# Patient Record
Sex: FEMALE | Race: ASIAN | NOT HISPANIC OR LATINO | Employment: UNEMPLOYED | URBAN - NONMETROPOLITAN AREA
[De-identification: names, ages, dates, MRNs, and addresses within clinical notes are randomized per-mention and may not be internally consistent; named-entity substitution may affect disease eponyms.]

---

## 2019-12-06 ENCOUNTER — TRANSFERRED RECORDS (OUTPATIENT)
Dept: HEALTH INFORMATION MANAGEMENT | Facility: OTHER | Age: 44
End: 2019-12-06

## 2019-12-16 ENCOUNTER — OFFICE VISIT (OUTPATIENT)
Dept: FAMILY MEDICINE | Facility: OTHER | Age: 44
End: 2019-12-16
Attending: NURSE PRACTITIONER

## 2019-12-16 ENCOUNTER — HOSPITAL ENCOUNTER (OUTPATIENT)
Dept: GENERAL RADIOLOGY | Facility: OTHER | Age: 44
Discharge: HOME | End: 2019-12-16
Attending: NURSE PRACTITIONER | Admitting: NURSE PRACTITIONER

## 2019-12-16 VITALS
DIASTOLIC BLOOD PRESSURE: 86 MMHG | BODY MASS INDEX: 25.34 KG/M2 | RESPIRATION RATE: 16 BRPM | SYSTOLIC BLOOD PRESSURE: 136 MMHG | HEIGHT: 63 IN | HEART RATE: 80 BPM | OXYGEN SATURATION: 98 % | TEMPERATURE: 98.6 F | WEIGHT: 143 LBS

## 2019-12-16 DIAGNOSIS — Z20.1 HISTORY OF TUBERCULOSIS EXPOSURE: ICD-10-CM

## 2019-12-16 DIAGNOSIS — Z00.00 ROUTINE HISTORY AND PHYSICAL EXAMINATION OF ADULT: Primary | ICD-10-CM

## 2019-12-16 DIAGNOSIS — Z12.4 CERVICAL CANCER SCREENING: ICD-10-CM

## 2019-12-16 DIAGNOSIS — E03.9 HYPOTHYROIDISM, UNSPECIFIED TYPE: ICD-10-CM

## 2019-12-16 DIAGNOSIS — Z60.3 IMMIGRANT WITH LANGUAGE DIFFICULTY: ICD-10-CM

## 2019-12-16 LAB
ALBUMIN SERPL-MCNC: 4.7 G/DL (ref 3.5–5.7)
ALP SERPL-CCNC: 77 U/L (ref 34–104)
ALT SERPL W P-5'-P-CCNC: 12 U/L (ref 7–52)
ANION GAP SERPL CALCULATED.3IONS-SCNC: 9 MMOL/L (ref 3–14)
AST SERPL W P-5'-P-CCNC: 17 U/L (ref 13–39)
BASOPHILS # BLD AUTO: 0.1 10E9/L (ref 0–0.2)
BASOPHILS NFR BLD AUTO: 0.9 %
BILIRUB SERPL-MCNC: 0.3 MG/DL (ref 0.3–1)
BUN SERPL-MCNC: 11 MG/DL (ref 7–25)
CALCIUM SERPL-MCNC: 9.2 MG/DL (ref 8.6–10.3)
CHLORIDE SERPL-SCNC: 101 MMOL/L (ref 98–107)
CO2 SERPL-SCNC: 27 MMOL/L (ref 21–31)
CREAT SERPL-MCNC: 0.66 MG/DL (ref 0.6–1.2)
DIFFERENTIAL METHOD BLD: NORMAL
EOSINOPHIL # BLD AUTO: 0.1 10E9/L (ref 0–0.7)
EOSINOPHIL NFR BLD AUTO: 1.6 %
ERYTHROCYTE [DISTWIDTH] IN BLOOD BY AUTOMATED COUNT: 12.5 % (ref 10–15)
GFR SERPL CREATININE-BSD FRML MDRD: >90 ML/MIN/{1.73_M2}
GLUCOSE SERPL-MCNC: 117 MG/DL (ref 70–105)
HCT VFR BLD AUTO: 40.7 % (ref 35–47)
HGB BLD-MCNC: 13.2 G/DL (ref 11.7–15.7)
IMM GRANULOCYTES # BLD: 0 10E9/L (ref 0–0.4)
IMM GRANULOCYTES NFR BLD: 0.2 %
LYMPHOCYTES # BLD AUTO: 2 10E9/L (ref 0.8–5.3)
LYMPHOCYTES NFR BLD AUTO: 33.7 %
MCH RBC QN AUTO: 27.9 PG (ref 26.5–33)
MCHC RBC AUTO-ENTMCNC: 32.4 G/DL (ref 31.5–36.5)
MCV RBC AUTO: 86 FL (ref 78–100)
MONOCYTES # BLD AUTO: 0.5 10E9/L (ref 0–1.3)
MONOCYTES NFR BLD AUTO: 7.9 %
NEUTROPHILS # BLD AUTO: 3.2 10E9/L (ref 1.6–8.3)
NEUTROPHILS NFR BLD AUTO: 55.7 %
PLATELET # BLD AUTO: 307 10E9/L (ref 150–450)
POTASSIUM SERPL-SCNC: 3.5 MMOL/L (ref 3.5–5.1)
PROT SERPL-MCNC: 8 G/DL (ref 6.4–8.9)
RBC # BLD AUTO: 4.73 10E12/L (ref 3.8–5.2)
SODIUM SERPL-SCNC: 137 MMOL/L (ref 134–144)
TSH SERPL DL<=0.05 MIU/L-ACNC: 0.86 IU/ML (ref 0.34–5.6)
WBC # BLD AUTO: 5.8 10E9/L (ref 4–11)

## 2019-12-16 PROCEDURE — 87624 HPV HI-RISK TYP POOLED RSLT: CPT | Mod: ZL | Performed by: NURSE PRACTITIONER

## 2019-12-16 PROCEDURE — 80053 COMPREHEN METABOLIC PANEL: CPT | Mod: ZL | Performed by: NURSE PRACTITIONER

## 2019-12-16 PROCEDURE — 99386 PREV VISIT NEW AGE 40-64: CPT | Performed by: NURSE PRACTITIONER

## 2019-12-16 PROCEDURE — 36415 COLL VENOUS BLD VENIPUNCTURE: CPT | Mod: ZL | Performed by: NURSE PRACTITIONER

## 2019-12-16 PROCEDURE — 84443 ASSAY THYROID STIM HORMONE: CPT | Mod: ZL | Performed by: NURSE PRACTITIONER

## 2019-12-16 PROCEDURE — 88142 CYTOPATH C/V THIN LAYER: CPT | Performed by: NURSE PRACTITIONER

## 2019-12-16 PROCEDURE — G0123 SCREEN CERV/VAG THIN LAYER: HCPCS | Performed by: NURSE PRACTITIONER

## 2019-12-16 PROCEDURE — 85025 COMPLETE CBC W/AUTO DIFF WBC: CPT | Mod: ZL | Performed by: NURSE PRACTITIONER

## 2019-12-16 PROCEDURE — 86481 TB AG RESPONSE T-CELL SUSP: CPT | Mod: ZL | Performed by: NURSE PRACTITIONER

## 2019-12-16 PROCEDURE — 71046 X-RAY EXAM CHEST 2 VIEWS: CPT

## 2019-12-16 RX ORDER — PROPRANOLOL HYDROCHLORIDE 40 MG/1
40 TABLET ORAL 2 TIMES DAILY
COMMUNITY
Start: 2019-12-16 | End: 2022-11-04

## 2019-12-16 SDOH — HEALTH STABILITY: MENTAL HEALTH: HOW OFTEN DO YOU HAVE A DRINK CONTAINING ALCOHOL?: NEVER

## 2019-12-16 SDOH — SOCIAL STABILITY - SOCIAL INSECURITY: ACCULTURATION DIFFICULTY: Z60.3

## 2019-12-16 ASSESSMENT — MIFFLIN-ST. JEOR: SCORE: 1272.77

## 2019-12-16 ASSESSMENT — PAIN SCALES - GENERAL: PAINLEVEL: NO PAIN (0)

## 2019-12-16 NOTE — LETTER
December 27, 2019    Lynn Rod  [address] [facility] [address]    Dear MsKamranMarcel,  This letter is regarding your recent Pap smear (cervical cancer screening) and Human Papillomavirus (HPV) test.  We are happy to inform you that your Pap smear result is normal. Cervical cancer is closely linked with certain types of HPV. Your results DOES showed evidence of high-risk HPV.  We recommend you have your next PAP smear and HPV test in 1 year.  You will still need to return to the clinic every year for an annual exam and other preventive tests.  If you have additional questions regarding this result, please call.    Sincerely,    MARCUS Bryan CNP

## 2019-12-16 NOTE — PROGRESS NOTES
Nursing Notes:   Narcisa Canseco LPN  12/16/2019  8:34 AM  Signed  Patient presents to clinic today for a physical. She states she doesn't have any concerns today. We did receive faxed paperwork for patient.     No LMP recorded.  Medication Reconciliation: complete    Narcisa Canseco LPN  12/16/2019 8:28 AM      ANNUAL PHYSICAL - FEMALE    HPI: Lynn Rod is a 44 year old female who presents for a yearly exam.  She recently immigrated from the [facility] to the [place] and has some paperwork to have completed from [facility].  She did have a chest x-ray that was concerning for possible tuberculosis.  Sputum cultures were negative prior to her coming to [facility].  She needs to have repeat chest x-ray and lab work done today in regards to this.  She has no other concerns at this time.  The records that she is provided with today indicate class B1 TB, pulmonary.  Sputum AFB smear is negative on June 17, 18th and 19th of 2018.  Cultures done on same date likewise negative after 8 weeks of incubation, August 2018.  She is not symptomatic.  No history of TB treatment.  They indicated she was eligible to leave for the needed states but if she became symptomatic prior to departure she would need to return and be seen.    She states that she got  just a few days ago.  She is accompanied by her new  at her clinic visit.  She does speak English but is somewhat broken.  She does not know that she is ever had a Pap smear.  She is never had a mammogram.  She states she has thyroid issues and currently takes a medication for this.  She also has what she reports as heart issues and takes medications for this as well.  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  She did have tetanus, MMR, varicella and influenza vaccine prior to coming to [place].  Her  current medications include carbimazole 20 mg daily and propranolol 40 mg twice daily.      Patient's last menstrual period was 11/27/2019.   Contraception:  sterile  Risk for STI?: testing done recently  Last pap: unsure if ever  Any hx of abnormal paps:  unknown  FH of early CA?: unknown  Cholesterol/DM concerns/screening: due for screening  Tobacco?: none  Calcium intake: dietary  DEXA: due age 61  Last mammo: never  Colonoscopy: due at age 51  Immunizations: Tdap, varicella, influenza, MMR updated recently    There are no active problems to display for this patient.      History reviewed. No pertinent past medical history.    Past Surgical History:   Procedure Laterality Date     DILATION AND CURETTAGE      2014 in 2016 for abortions.     Partial amputation      First digit right hand, posttraumatic at age 7 years old       Family History   Family history unknown: Yes       Social History     Socioeconomic History     Marital status: Single     Spouse name: Not on file     Number of children: Not on file     Years of education: Not on file     Highest education level: Not on file   Occupational History     Not on file   Social Needs     Financial resource strain: Not on file     Food insecurity:     Worry: Not on file     Inability: Not on file     Transportation needs:     Medical: Not on file     Non-medical: Not on file   Tobacco Use     Smoking status: Never Smoker     Smokeless tobacco: Never Used   Substance and Sexual Activity     Alcohol use: Yes     Frequency: Never     Comment: Occ     Drug use: Never     Sexual activity: Yes     Partners: Male   Lifestyle     Physical activity:     Days per week: Not on file     Minutes per session: Not on file     Stress: Not on file   Relationships     Social connections:     Talks on phone: Not on file     Gets together: Not on file     Attends Nondenominational service: Not on file     Active member of club or organization: Not on file     Attends meetings of clubs  "or organizations: Not on file     Relationship status: Not on file     Intimate partner violence:     Fear of current or ex partner: Not on file     Emotionally abused: Not on file     Physically abused: Not on file     Forced sexual activity: Not on file   Other Topics Concern     Not on file   Social History Narrative    Moved from [facility] to the [place].  Has an adult daughter born in 1996 who still lives in the [facility].       Current Outpatient Medications   Medication Sig Dispense Refill     propranolol (INDERAL) 40 MG tablet Take 1 tablet (40 mg) by mouth 2 times daily         No Known Allergies    REVIEW OF SYSTEMS:  Complete ROS obtained, all pertinent positives/negatives noted above.     PHYSICAL EXAM:  /86 (BP Location: Right arm, Patient Position: Sitting, Cuff Size: Adult Regular)   Pulse 80   Temp 98.6  F (37  C) (Temporal)   Resp 16   Ht 1.6 m (5' 3\")   Wt 64.9 kg (143 lb)   LMP 11/27/2019   SpO2 98%   Breastfeeding No   BMI 25.33 kg/m    CONSTITUTIONAL:  Alert,cooperative, NAD.  EYES: No scleral icterus.  PERRLA.  Conjunctiva clear.  ENT/MOUTH: External ears and nose normal.  TMs normal.  Moist mucous membranes. Oropharynx clear.    ENDO: No thyromegaly or thyroidnodules.  LYMPH:  No cervical or supraclavicular LA.    BREASTS: No skin abnormalities, no erythema.  No discrete masses.  No nipple discharge, no axillary, supra- or infraclavicular LA.   CARDIOVASCULAR: Regular,S1, S2.  No S3 or S4.  No murmur/gallop/rub.  No peripheral edema.  RESPIRATORY: CTA bilaterally, no wheezes, rhonchi or rales.  GI: Bowel sounds wnl.  Soft, nontender, nondistended.  No masses or HSM.  No rebound orguarding.  : Vulva: normal, no lesions or discharge  Urethral meatus: normal size and location, no lesions or discharge  Urethra: no tenderness or masses  Bladder: no fullness or tenderness  Vagina: normalappearance, no abnormal discharge, no lesions.  No evidence of cystocele or " rectocele.  Cervix: normal appearance, no lesions, no abnormal discharge, no cervical motion tenderness  Uterus: normal size and position,mobile, non-tender  Adnexa: no palpable masses bilaterally. No cervical motion tenderness.  Pap smear obtained: obtained  MSKEL: Grossly normal ROM.  Noclubbing.  INTEGUMENTARY:  Warm, dry.  No rash noted on exposed skin.  NEUROLOGIC: Facies symmetric.  Grossly normal movement and tone.  No tremor.  PSYCHIATRIC: Affect normal.  Speech fluent.      PHQ Depression Screen  No flowsheet data found.    Labs:  Results for orders placed or performed in visit on 12/16/19   CBC and Differential     Status: None   Result Value Ref Range    WBC 5.8 4.0 - 11.0 10e9/L    RBC Count 4.73 3.8 - 5.2 10e12/L    Hemoglobin 13.2 11.7 - 15.7 g/dL    Hematocrit 40.7 35.0 - 47.0 %    MCV 86 78 - 100 fl    MCH 27.9 26.5 - 33.0 pg    MCHC 32.4 31.5 - 36.5 g/dL    RDW 12.5 10.0 - 15.0 %    Platelet Count 307 150 - 450 10e9/L    Diff Method Automated Method     % Neutrophils 55.7 %    % Lymphocytes 33.7 %    % Monocytes 7.9 %    % Eosinophils 1.6 %    % Basophils 0.9 %    % Immature Granulocytes 0.2 %    Absolute Neutrophil 3.2 1.6 - 8.3 10e9/L    Absolute Lymphocytes 2.0 0.8 - 5.3 10e9/L    Absolute Monocytes 0.5 0.0 - 1.3 10e9/L    Absolute Eosinophils 0.1 0.0 - 0.7 10e9/L    Absolute Basophils 0.1 0.0 - 0.2 10e9/L    Abs Immature Granulocytes 0.0 0 - 0.4 10e9/L   TSH Reflex GH     Status: None   Result Value Ref Range    TSH Reflex 0.86 0.34 - 5.60 IU/mL   Comprehensive Metabolic Panel     Status: Abnormal   Result Value Ref Range    Sodium 137 134 - 144 mmol/L    Potassium 3.5 3.5 - 5.1 mmol/L    Chloride 101 98 - 107 mmol/L    Carbon Dioxide 27 21 - 31 mmol/L    Anion Gap 9 3 - 14 mmol/L    Glucose 117 (H) 70 - 105 mg/dL    Urea Nitrogen 11 7 - 25 mg/dL    Creatinine 0.66 0.60 - 1.20 mg/dL    GFR Estimate >90 >60 mL/min/[1.73_m2]    GFR Estimate If Black >90 >60 mL/min/[1.73_m2]    Calcium 9.2 8.6 -  10.3 mg/dL    Bilirubin Total 0.3 0.3 - 1.0 mg/dL    Albumin 4.7 3.5 - 5.7 g/dL    Protein Total 8.0 6.4 - 8.9 g/dL    Alkaline Phosphatase 77 34 - 104 U/L    ALT 12 7 - 52 U/L    AST 17 13 - 39 U/L       ASSESSMENT AND PLAN:    1. Routine history and physical examination of adult    2. History of tuberculosis exposure    3. Immigrant with language difficulty    4. Cervical cancer screening    5. Hypothyroidism, unspecified type    Mammogram and Pap smear have been obtained/ordered.  We will follow-up results when these are available.  Labs as noted above.  Chest x-ray does not show any concerns for TB.  Blood work is still pending on this.  We will complete paperwork for [facility] when this is available.      Relevant cancer screening discussed.    Counseled on healthy diet, Calcium and vitamin D intake, and exercise.    MARCUS Bryan CNP ....................  12/16/2019   8:30 AM       This document was prepared using voice generated software.  While every attempt was made for accuracy, grammatical errors may exist.

## 2019-12-16 NOTE — NURSING NOTE
Patient presents to clinic today for a physical. She states she doesn't have any concerns today. We did receive faxed paperwork for patient.     No LMP recorded.  Medication Reconciliation: complete    Narcisa Canseco LPN  12/16/2019 8:28 AM

## 2019-12-16 NOTE — PATIENT INSTRUCTIONS
"Healthy Strategies  1. Eat at least 3 meals a day and never skip breakfast.  2. Eat more slowly.  3. Decrease portion size.  4. Provide structure by using meal replacement bars or shakes, and/or low calorie frozen meals.  5. For good nutrition incorporate fruit, vegetables, whole grains, lean protein, and low-fat dairy.  6. Remove trigger foods from yourenvironment to avoid impulse eating.  7. Increase physical activity: get a pedometer and aim for 10,000 steps a day or 30-35 minutes of activity 5 days per week.  8. Weigh yourself daily or at least weekly.  9. Keep a record of what you eat and your activity.  10. Establish a support system such as afriend, group or program.    11. Read Igor You's \"Eat to Live\". Remember it is important to have a minimum of 1200 calories a day, okay to use olive oil, 40 grams of fiber daily. No more than two servings (the size of your palm) of red meat a week.     Please consider the following general health recommendations:    Schedule a mammogram annually starting at the age of 40 years old unless recommended earlier by your primary care provider. Come for a general exam once yearly.    Eat a quality diet (generally, low in simple sugars, starches, cholesterol and saturated fat.)    Please get 1500 mg of calcium in divided doses with 1500 units vitamin D in your diet daily. Take supplements as needed to obtain full recommended amounts.     Stay physically active. Regular walking or other exercise is one of the best ways to minimize pain of arthritis; maintain independence and mobility; maintain bone strength; maintain conditioning of your heart. Find something you enjoy and a friend to do it with you.    Maintain ideal weight. Your Body mass index is Body mass index is 25.33 kg/m .. Generally a BMI of 20-25 is considered ideal. Overweight is defined as 25-30, obese is 30-35 and markedly obese is greater than 35.    Apply sun block (SPF 25 or greater) on exposed skin anytime you " are out in the sun to prevent skin cancer.     Wear a seatbelt whenever you are in a car.    Consider a bone density study every 2-5 years to see if you are at increased risk for fracture. If you have osteoporosis, medicine to strengthen your bones can significantly reduce your risk of fracture, back pain, and loss of height.    Colonoscopy (an exam of the colon) is recommended every 10 years after the age of 50 to screen for colon cancer. (More often if you are at increased risk.)    Obtain a flu shot every fall.    Receive a pneumonia shot series after the age of 65 (repeat in 5 years if you have other risk factors). This does not prevent all types of pneumonia, but reduces the risk of the worst bacterial causes of pneumonia.    You should have a tetanus booster at least once every 10 years.    A vaccine to reduce your chances of getting shingles is available if you are over 50. Information about the vaccine is available through the clinic or at:  (https://www.cdc.gov/vaccines/vpd/shingles/public/shingrix/index.html)    Check blood sugar annually. Cholesterol annually unless you have had a normal level when last checked within 5 years.     I recommend that you have a general physical exam every year. You should have a pap test every 3 years between the ages of 21 and 30 and every 3-5 years between the ages of 30 and 65 depending on your test unless you have had previous abnormal pap smears, (in these cases the exams and PAP's should be done on a schedule as recommended by your primary care provider). If you have had hysterectomy in the past, your future Pap plan may be different.

## 2019-12-16 NOTE — LETTER
December 19, 2019      Lynn Rod  [address] [facility] [address]        Dear ,    We are writing to inform you of your test results.    Your test results fall within the expected range(s) or remain unchanged from previous results.  Please continue with current treatment plan.    Resulted Orders   CBC and Differential   Result Value Ref Range    WBC 5.8 4.0 - 11.0 10e9/L    RBC Count 4.73 3.8 - 5.2 10e12/L    Hemoglobin 13.2 11.7 - 15.7 g/dL    Hematocrit 40.7 35.0 - 47.0 %    MCV 86 78 - 100 fl    MCH 27.9 26.5 - 33.0 pg    MCHC 32.4 31.5 - 36.5 g/dL    RDW 12.5 10.0 - 15.0 %    Platelet Count 307 150 - 450 10e9/L    Diff Method Automated Method     % Neutrophils 55.7 %    % Lymphocytes 33.7 %    % Monocytes 7.9 %    % Eosinophils 1.6 %    % Basophils 0.9 %    % Immature Granulocytes 0.2 %    Absolute Neutrophil 3.2 1.6 - 8.3 10e9/L    Absolute Lymphocytes 2.0 0.8 - 5.3 10e9/L    Absolute Monocytes 0.5 0.0 - 1.3 10e9/L    Absolute Eosinophils 0.1 0.0 - 0.7 10e9/L    Absolute Basophils 0.1 0.0 - 0.2 10e9/L    Abs Immature Granulocytes 0.0 0 - 0.4 10e9/L   TSH Reflex GH   Result Value Ref Range    TSH Reflex 0.86 0.34 - 5.60 IU/mL   Quantiferon TB Gold Plus   Result Value Ref Range    Quantiferon-TB Gold Plus Result Positive (A) NEG^Negative      Comment:      Interferon gamma response to M.tuberculosis antigens was detected,suggesting   infection with M.tuberculosis. Positive results in patients at low risk for   infection should be interpreted with caution and repeat testing on a new   sample should be considered    as recommended by the 2017 ATS/IDSA/CDC Clinical Practice Guidelines for   Diagnosis of Tuberculosis in Adults and Children [Ezrainsalina HERNÁNDEZ et al.   Clin.Infect.Dis. 2017 64(92):111-115]. False positive results may occur in   patients with prior infection with M.marinum, M.szulgai or M.kansasii.      TB1 Ag minus Nil Value 1.13 IU/mL    TB2 Ag minus Nil Value 1.39 IU/mL    Mitogen minus  Nil Result >10.00 IU/mL    Nil Result 0.22 IU/mL   Comprehensive Metabolic Panel   Result Value Ref Range    Sodium 137 134 - 144 mmol/L    Potassium 3.5 3.5 - 5.1 mmol/L    Chloride 101 98 - 107 mmol/L    Carbon Dioxide 27 21 - 31 mmol/L    Anion Gap 9 3 - 14 mmol/L    Glucose 117 (H) 70 - 105 mg/dL    Urea Nitrogen 11 7 - 25 mg/dL    Creatinine 0.66 0.60 - 1.20 mg/dL    GFR Estimate >90 >60 mL/min/[1.73_m2]    GFR Estimate If Black >90 >60 mL/min/[1.73_m2]    Calcium 9.2 8.6 - 10.3 mg/dL    Bilirubin Total 0.3 0.3 - 1.0 mg/dL    Albumin 4.7 3.5 - 5.7 g/dL    Protein Total 8.0 6.4 - 8.9 g/dL    Alkaline Phosphatase 77 34 - 104 U/L    ALT 12 7 - 52 U/L    AST 17 13 - 39 U/L       If you have any questions or concerns, please call the clinic at the number listed above.       Sincerely,        MARCUS Bryan CNP

## 2019-12-18 LAB
GAMMA INTERFERON BACKGROUND BLD IA-ACNC: 0.22 IU/ML
M TB IFN-G BLD-IMP: POSITIVE
M TB IFN-G CD4+ BCKGRND COR BLD-ACNC: >10 IU/ML
MITOGEN IGNF BCKGRD COR BLD-ACNC: 1.13 IU/ML
MITOGEN IGNF BCKGRD COR BLD-ACNC: 1.39 IU/ML

## 2019-12-19 LAB
COPATH REPORT: NORMAL
PAP: NORMAL

## 2019-12-24 ENCOUNTER — TELEPHONE (OUTPATIENT)
Dept: FAMILY MEDICINE | Facility: OTHER | Age: 44
End: 2019-12-24

## 2019-12-24 NOTE — TELEPHONE ENCOUNTER
Spoke with patient and  and reviewed letter from recent labs. Patient has had exposure, but no evidence of active TB. They verbalized understanding.   Narcisa Canseco LPN...................12/24/2019  10:09 AM

## 2019-12-26 ENCOUNTER — HOSPITAL ENCOUNTER (OUTPATIENT)
Dept: MAMMOGRAPHY | Facility: OTHER | Age: 44
Discharge: HOME | End: 2019-12-26
Attending: NURSE PRACTITIONER | Admitting: NURSE PRACTITIONER

## 2019-12-26 DIAGNOSIS — Z12.31 VISIT FOR SCREENING MAMMOGRAM: ICD-10-CM

## 2019-12-26 LAB
FINAL DIAGNOSIS: ABNORMAL
HPV HR 12 DNA CVX QL NAA+PROBE: POSITIVE
HPV16 DNA SPEC QL NAA+PROBE: NEGATIVE
HPV18 DNA SPEC QL NAA+PROBE: NEGATIVE
SPECIMEN DESCRIPTION: ABNORMAL
SPECIMEN SOURCE CVX/VAG CYTO: ABNORMAL

## 2019-12-26 PROCEDURE — 77067 SCR MAMMO BI INCL CAD: CPT

## 2020-01-02 ENCOUNTER — HOSPITAL ENCOUNTER (OUTPATIENT)
Dept: MAMMOGRAPHY | Facility: OTHER | Age: 45
Discharge: HOME | End: 2020-01-02
Attending: NURSE PRACTITIONER | Admitting: NURSE PRACTITIONER

## 2020-01-02 DIAGNOSIS — R92.8 ABNORMAL MAMMOGRAM: ICD-10-CM

## 2020-01-02 PROCEDURE — 77065 DX MAMMO INCL CAD UNI: CPT

## 2020-03-12 ENCOUNTER — TELEPHONE (OUTPATIENT)
Dept: FAMILY MEDICINE | Facility: OTHER | Age: 45
End: 2020-03-12

## 2020-03-12 DIAGNOSIS — E05.90 HYPERTHYROIDISM: Primary | ICD-10-CM

## 2020-03-16 ENCOUNTER — OFFICE VISIT (OUTPATIENT)
Dept: FAMILY MEDICINE | Facility: OTHER | Age: 45
End: 2020-03-16
Attending: NURSE PRACTITIONER

## 2020-03-16 VITALS
OXYGEN SATURATION: 98 % | TEMPERATURE: 97.6 F | HEIGHT: 63 IN | HEART RATE: 84 BPM | SYSTOLIC BLOOD PRESSURE: 110 MMHG | WEIGHT: 138.8 LBS | BODY MASS INDEX: 24.59 KG/M2 | DIASTOLIC BLOOD PRESSURE: 78 MMHG | RESPIRATION RATE: 16 BRPM

## 2020-03-16 DIAGNOSIS — E05.90 HYPERTHYROIDISM: Primary | ICD-10-CM

## 2020-03-16 PROCEDURE — 99213 OFFICE O/P EST LOW 20 MIN: CPT | Performed by: NURSE PRACTITIONER

## 2020-03-16 RX ORDER — METHIMAZOLE 5 MG/1
5 TABLET ORAL DAILY
Qty: 30 TABLET | Refills: 3 | Status: SHIPPED | OUTPATIENT
Start: 2020-03-16 | End: 2022-11-04

## 2020-03-16 ASSESSMENT — MIFFLIN-ST. JEOR: SCORE: 1253.72

## 2020-03-16 ASSESSMENT — PAIN SCALES - GENERAL: PAINLEVEL: NO PAIN (0)

## 2020-03-16 NOTE — PROGRESS NOTES
"HPI:    Lynn Rod is a 44 year old female who presents to clinic today for medication management.  She reports diagnosed of hyperthyroidism.  She moved from the [facility] a few months ago.  Had been taking neomerdin 5 mg daily for this.  She is out of medications and is unable to get this filled and then and states.  She is looking to get a different medication filled today.  Denies any symptoms at this time.    History reviewed. No pertinent past medical history.    Past Surgical History:   Procedure Laterality Date     DILATION AND CURETTAGE      2014 in 2016 for abortions.     Partial amputation      First digit right hand, posttraumatic at age 7 years old         Current Outpatient Medications   Medication Sig Dispense Refill     methimazole (TAPAZOLE) 5 MG tablet Take 1 tablet (5 mg) by mouth daily 30 tablet 3     propranolol (INDERAL) 40 MG tablet Take 1 tablet (40 mg) by mouth 2 times daily         No Known Allergies    ROS:  Pertinent positives and negatives are noted in HPI.    EXAM:  /78   Pulse 84   Temp 97.6  F (36.4  C)   Resp 16   Ht 1.6 m (5' 3\")   Wt 63 kg (138 lb 12.8 oz)   SpO2 98%   BMI 24.59 kg/m    General appearance: well appearing female, in no acute distress  Neck: supple without adenopathy  Respiratory: clear to auscultation bilaterally  Cardiac: RRR with no murmurs  Psychological: normal affect, alert and pleasant    ASSESSMENT AND PLAN:    1. Hyperthyroidism        Medication changed to methimazole 5 mg daily.  Plan to have her recheck her thyroid levels in 6 months due to change in medication.  She will follow-up sooner as needed.    Tameka King, MARCUS CNP..................3/16/2020 11:04 AM      This document was prepared using voice generated software.  While every attempt was made for accuracy, grammatical errors may exist.  "

## 2020-03-16 NOTE — NURSING NOTE
Patient presents today for medication management.  Medication Reconciliation Complete    Comfort Segal LPN  3/16/2020 11:03 AM

## 2020-04-27 DIAGNOSIS — E05.90 HYPERTHYROIDISM: ICD-10-CM

## 2020-04-27 LAB
T3FREE SERPL-MCNC: 9.9 PG/ML (ref 2.5–3.9)
T4 FREE SERPL-MCNC: 3.32 NG/DL (ref 0.6–1.6)
TSH SERPL DL<=0.05 MIU/L-ACNC: <0.2 IU/ML (ref 0.34–5.6)

## 2020-04-27 PROCEDURE — 36415 COLL VENOUS BLD VENIPUNCTURE: CPT | Mod: ZL | Performed by: NURSE PRACTITIONER

## 2020-04-27 PROCEDURE — 84443 ASSAY THYROID STIM HORMONE: CPT | Mod: ZL | Performed by: NURSE PRACTITIONER

## 2020-04-27 PROCEDURE — 84481 FREE ASSAY (FT-3): CPT | Mod: ZL | Performed by: NURSE PRACTITIONER

## 2020-04-27 PROCEDURE — 84439 ASSAY OF FREE THYROXINE: CPT | Mod: ZL | Performed by: NURSE PRACTITIONER

## 2020-04-30 RX ORDER — METHIMAZOLE 10 MG/1
10 TABLET ORAL DAILY
Qty: 90 TABLET | Refills: 0 | Status: SHIPPED | OUTPATIENT
Start: 2020-04-30 | End: 2020-07-30

## 2020-04-30 NOTE — TELEPHONE ENCOUNTER
Please call and let her know her thyroid testing is abnormal. I would like her to take the methimazole at 10 mg daily. I also placed a referral to endocrinology at [facility]. MARCUS Bryan CNP on 4/30/2020 at 12:14 PM

## 2020-07-26 DIAGNOSIS — E05.90 HYPERTHYROIDISM: ICD-10-CM

## 2020-07-28 ENCOUNTER — TELEPHONE (OUTPATIENT)
Dept: FAMILY MEDICINE | Facility: OTHER | Age: 45
End: 2020-07-28

## 2020-07-28 DIAGNOSIS — E05.90 HYPERTHYROIDISM: Primary | ICD-10-CM

## 2020-07-28 NOTE — TELEPHONE ENCOUNTER
Reason for call: Request for a referral.    Referral requested for what concern?  endocrinology    Have you already been seen by the specialty you need the referral for?  NO    If yes, Date:   NA,  Location:   Na,  Provider:   Na    If no,  Where do you want to go?   [facility]  Additional comments:   NA    Preferred method for responding to this message: Telephone Call    Phone number patient can be reached at? Cell number on file:    Telephone Information:   Mobile [phone]       If we can't reach you directly, may we leave a detailed response at the number you provided? Yes

## 2020-07-29 NOTE — TELEPHONE ENCOUNTER
Routing refill request to provider for review/approval because:   Refills for this medication group require provider approval Protocol Details    Normal TSH in past 12 months      LOV 3/16/2020  Ana Delgado RN on 7/29/2020 at 8:34 AM

## 2020-07-30 RX ORDER — METHIMAZOLE 10 MG/1
TABLET ORAL
Qty: 90 TABLET | Refills: 0 | Status: SHIPPED | OUTPATIENT
Start: 2020-07-30 | End: 2021-02-15

## 2021-02-03 ENCOUNTER — TRANSFERRED RECORDS (OUTPATIENT)
Dept: MULTI SPECIALTY CLINIC | Facility: CLINIC | Age: 46
End: 2021-02-03

## 2021-02-03 LAB
CHOLESTEROL (EXTERNAL): 242 MG/DL (ref 114–200)
HDLC SERPL-MCNC: 52 MG/DL (ref 40–60)
TRIGLYCERIDES (EXTERNAL): 177 MG/DL (ref 10–200)

## 2021-02-15 ENCOUNTER — OFFICE VISIT (OUTPATIENT)
Dept: FAMILY MEDICINE | Facility: OTHER | Age: 46
End: 2021-02-15
Attending: NURSE PRACTITIONER
Payer: COMMERCIAL

## 2021-02-15 VITALS
RESPIRATION RATE: 18 BRPM | HEART RATE: 81 BPM | HEIGHT: 63 IN | WEIGHT: 141.4 LBS | OXYGEN SATURATION: 99 % | BODY MASS INDEX: 25.05 KG/M2 | TEMPERATURE: 98.2 F | DIASTOLIC BLOOD PRESSURE: 100 MMHG | SYSTOLIC BLOOD PRESSURE: 142 MMHG

## 2021-02-15 DIAGNOSIS — R04.2 BLOODY SPUTUM: Primary | ICD-10-CM

## 2021-02-15 PROCEDURE — 99213 OFFICE O/P EST LOW 20 MIN: CPT | Performed by: NURSE PRACTITIONER

## 2021-02-15 SDOH — HEALTH STABILITY: MENTAL HEALTH: HOW OFTEN DO YOU HAVE 6 OR MORE DRINKS ON ONE OCCASION?: NOT ASKED

## 2021-02-15 SDOH — HEALTH STABILITY: MENTAL HEALTH: HOW OFTEN DO YOU HAVE A DRINK CONTAINING ALCOHOL?: MONTHLY OR LESS

## 2021-02-15 SDOH — HEALTH STABILITY: MENTAL HEALTH: HOW MANY STANDARD DRINKS CONTAINING ALCOHOL DO YOU HAVE ON A TYPICAL DAY?: NOT ASKED

## 2021-02-15 ASSESSMENT — PAIN SCALES - GENERAL: PAINLEVEL: NO PAIN (0)

## 2021-02-15 ASSESSMENT — MIFFLIN-ST. JEOR: SCORE: 1252.58

## 2021-02-15 NOTE — PATIENT INSTRUCTIONS
Use nasal saline spray a couple times daily  Humidification in the home, humidifier/boiling water  Follow up as needed
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
## 2021-04-25 ENCOUNTER — HEALTH MAINTENANCE LETTER (OUTPATIENT)
Age: 46
End: 2021-04-25

## 2021-06-11 ENCOUNTER — OFFICE VISIT (OUTPATIENT)
Dept: FAMILY MEDICINE | Facility: OTHER | Age: 46
End: 2021-06-11
Attending: NURSE PRACTITIONER
Payer: COMMERCIAL

## 2021-06-11 VITALS
HEART RATE: 65 BPM | DIASTOLIC BLOOD PRESSURE: 76 MMHG | HEIGHT: 63 IN | BODY MASS INDEX: 25.98 KG/M2 | TEMPERATURE: 97 F | SYSTOLIC BLOOD PRESSURE: 128 MMHG | OXYGEN SATURATION: 97 % | RESPIRATION RATE: 18 BRPM | WEIGHT: 146.6 LBS

## 2021-06-11 DIAGNOSIS — R22.0 JAW SWELLING: Primary | ICD-10-CM

## 2021-06-11 LAB
BASOPHILS # BLD AUTO: 0 10E9/L (ref 0–0.2)
BASOPHILS NFR BLD AUTO: 0.6 %
DIFFERENTIAL METHOD BLD: NORMAL
EOSINOPHIL # BLD AUTO: 0.1 10E9/L (ref 0–0.7)
EOSINOPHIL NFR BLD AUTO: 1.8 %
ERYTHROCYTE [DISTWIDTH] IN BLOOD BY AUTOMATED COUNT: 12.7 % (ref 10–15)
HCT VFR BLD AUTO: 37.6 % (ref 35–47)
HGB BLD-MCNC: 12.4 G/DL (ref 11.7–15.7)
IMM GRANULOCYTES # BLD: 0 10E9/L (ref 0–0.4)
IMM GRANULOCYTES NFR BLD: 0.3 %
LYMPHOCYTES # BLD AUTO: 2.6 10E9/L (ref 0.8–5.3)
LYMPHOCYTES NFR BLD AUTO: 38.8 %
MCH RBC QN AUTO: 27.8 PG (ref 26.5–33)
MCHC RBC AUTO-ENTMCNC: 33 G/DL (ref 31.5–36.5)
MCV RBC AUTO: 84 FL (ref 78–100)
MONOCYTES # BLD AUTO: 0.7 10E9/L (ref 0–1.3)
MONOCYTES NFR BLD AUTO: 10 %
NEUTROPHILS # BLD AUTO: 3.3 10E9/L (ref 1.6–8.3)
NEUTROPHILS NFR BLD AUTO: 48.5 %
PLATELET # BLD AUTO: 318 10E9/L (ref 150–450)
RBC # BLD AUTO: 4.46 10E12/L (ref 3.8–5.2)
WBC # BLD AUTO: 6.8 10E9/L (ref 4–11)

## 2021-06-11 PROCEDURE — 85025 COMPLETE CBC W/AUTO DIFF WBC: CPT | Mod: ZL | Performed by: NURSE PRACTITIONER

## 2021-06-11 PROCEDURE — 99213 OFFICE O/P EST LOW 20 MIN: CPT | Performed by: NURSE PRACTITIONER

## 2021-06-11 PROCEDURE — 36415 COLL VENOUS BLD VENIPUNCTURE: CPT | Mod: ZL | Performed by: NURSE PRACTITIONER

## 2021-06-11 ASSESSMENT — MIFFLIN-ST. JEOR: SCORE: 1276.16

## 2021-06-11 ASSESSMENT — PAIN SCALES - GENERAL: PAINLEVEL: MODERATE PAIN (4)

## 2021-06-11 NOTE — PROGRESS NOTES
"HPI:    Lynn Odom is a 45 year old female who presents to clinic today for left-sided jaw pain and swelling.  She states this is been present for a little over a week.  It is somewhat uncomfortable and she actually has more pain behind and under her jaw area and into her ear.  She has not had any recent illnesses.  No trouble swallowing.  No fevers.  She has used Tylenol and ice for symptomatic management.  She did go to the dentist yesterday and and x-rays which showed no dental concerns.    History reviewed. No pertinent past medical history.      Current Outpatient Medications   Medication Sig Dispense Refill     amoxicillin-clavulanate (AUGMENTIN) 875-125 MG tablet Take 1 tablet by mouth 2 times daily for 10 days 20 tablet 0     methimazole (TAPAZOLE) 5 MG tablet Take 1 tablet (5 mg) by mouth daily 30 tablet 3     propranolol (INDERAL) 40 MG tablet Take 1 tablet (40 mg) by mouth 2 times daily         No Known Allergies    ROS:  Pertinent positives and negatives are noted in HPI.    EXAM:  /76 (BP Location: Right arm, Patient Position: Sitting, Cuff Size: Adult Regular)   Pulse 65   Temp 97  F (36.1  C) (Temporal)   Resp 18   Ht 1.588 m (5' 2.5\")   Wt 66.5 kg (146 lb 9.6 oz)   SpO2 97%   Breastfeeding No   BMI 26.39 kg/m    General appearance: well appearing female, in no acute distress  Head: normocephalic, atraumatic  Ears: TM's with cone of light, no erythema, canals clear bilaterally  Eyes: conjunctivae normal  Oropharynx: moist mucous membranes, tonsils without erythema, exudates or petechiae, no post nasal drip seen  Neck: Left side of lower jaw and under jaw is swollen and tender to touch a couple small enlarged lymph nodes appreciated  Respiratory: clear to auscultation bilaterally  Cardiac: RRR with no murmurs  Psychological: normal affect, alert and pleasant  Results for orders placed or performed in visit on 06/11/21   CBC and Differential     Status: None   Result Value Ref " Range    WBC 6.8 4.0 - 11.0 10e9/L    RBC Count 4.46 3.8 - 5.2 10e12/L    Hemoglobin 12.4 11.7 - 15.7 g/dL    Hematocrit 37.6 35.0 - 47.0 %    MCV 84 78 - 100 fl    MCH 27.8 26.5 - 33.0 pg    MCHC 33.0 31.5 - 36.5 g/dL    RDW 12.7 10.0 - 15.0 %    Platelet Count 318 150 - 450 10e9/L    Diff Method Automated Method     % Neutrophils 48.5 %    % Lymphocytes 38.8 %    % Monocytes 10.0 %    % Eosinophils 1.8 %    % Basophils 0.6 %    % Immature Granulocytes 0.3 %    Absolute Neutrophil 3.3 1.6 - 8.3 10e9/L    Absolute Lymphocytes 2.6 0.8 - 5.3 10e9/L    Absolute Monocytes 0.7 0.0 - 1.3 10e9/L    Absolute Eosinophils 0.1 0.0 - 0.7 10e9/L    Absolute Basophils 0.0 0.0 - 0.2 10e9/L    Abs Immature Granulocytes 0.0 0 - 0.4 10e9/L       ASSESSMENT AND PLAN:    1. Jaw swelling      CBC obtained which was normal.  Overall exam is stable other than mild swelling and tenderness to left lower jaw.  Dental issues have been ruled out.  Will treat for probable infection with Augmentin twice daily for 10 days.  She will have close follow-up in clinic next Tuesday and if symptoms are not improving will consider imaging.  She will follow-up sooner in ER rapid clinic over the weekend if has progressively worsening symptoms.      MARCUS Bryan CNP..................6/11/2021 12:59 PM

## 2021-06-11 NOTE — NURSING NOTE
"Chief Complaint   Patient presents with     Jaw Pain     left side     Patient presents to clinic with left sided jaw pain. She states that it has been hurting for about a week and a half. Went to dentist and got x-rays, and they said nothing was showing there, and told her to see her PCP.    Initial BP (!) 140/100 (BP Location: Right arm, Patient Position: Sitting, Cuff Size: Adult Regular)   Pulse 65   Temp 97  F (36.1  C) (Temporal)   Resp 18   Ht 1.588 m (5' 2.5\")   Wt 66.5 kg (146 lb 9.6 oz)   SpO2 97%   Breastfeeding No   BMI 26.39 kg/m   Estimated body mass index is 26.39 kg/m  as calculated from the following:    Height as of this encounter: 1.588 m (5' 2.5\").    Weight as of this encounter: 66.5 kg (146 lb 9.6 oz).         Medication Reconciliation: Complete      Christy Adams   "

## 2021-06-15 ENCOUNTER — OFFICE VISIT (OUTPATIENT)
Dept: FAMILY MEDICINE | Facility: OTHER | Age: 46
End: 2021-06-15
Attending: SPECIALIST
Payer: COMMERCIAL

## 2021-06-15 VITALS
HEIGHT: 63 IN | WEIGHT: 145.2 LBS | SYSTOLIC BLOOD PRESSURE: 112 MMHG | HEART RATE: 57 BPM | RESPIRATION RATE: 19 BRPM | TEMPERATURE: 96.5 F | OXYGEN SATURATION: 98 % | DIASTOLIC BLOOD PRESSURE: 84 MMHG | BODY MASS INDEX: 25.73 KG/M2

## 2021-06-15 DIAGNOSIS — R22.0 JAW SWELLING: Primary | ICD-10-CM

## 2021-06-15 PROCEDURE — 99213 OFFICE O/P EST LOW 20 MIN: CPT | Performed by: NURSE PRACTITIONER

## 2021-06-15 ASSESSMENT — PAIN SCALES - GENERAL: PAINLEVEL: MILD PAIN (2)

## 2021-06-15 ASSESSMENT — MIFFLIN-ST. JEOR: SCORE: 1269.81

## 2021-06-15 NOTE — PROGRESS NOTES
"HPI:    Lynn Odom is a 45 year old female who presents to clinic today for follow-up on left side jostling.  She has been on Augmentin for 4 1/2 days.  She is continue to use cold compresses.  She does report that the swelling has gone down and she no longer has any pain to the left side of her jaw.    History reviewed. No pertinent past medical history.      Current Outpatient Medications   Medication Sig Dispense Refill     amoxicillin-clavulanate (AUGMENTIN) 875-125 MG tablet Take 1 tablet by mouth 2 times daily for 10 days 20 tablet 0     methimazole (TAPAZOLE) 5 MG tablet Take 1 tablet (5 mg) by mouth daily 30 tablet 3     propranolol (INDERAL) 40 MG tablet Take 1 tablet (40 mg) by mouth 2 times daily         No Known Allergies    ROS:  Pertinent positives and negatives are noted in HPI.    EXAM:  /84 (BP Location: Right arm, Patient Position: Sitting, Cuff Size: Adult Regular)   Pulse 57   Temp 96.5  F (35.8  C) (Temporal)   Resp 19   Ht 1.588 m (5' 2.5\")   Wt 65.9 kg (145 lb 3.2 oz)   SpO2 98%   BMI 26.13 kg/m    General appearance: well appearing female, in no acute distress  Musculoskeletal: Left-sided jaw with swelling that has decreased since last week.  No longer tender with palpation  Dermatological: no rashes or lesions  Psychological: normal affect, alert and pleasant    ASSESSMENT AND PLAN:    1. Jaw swelling        Improvement in left jaw swelling with treatment of Augmentin.  We will have her continue Augmentin and warm compresses.  Follow-up if does not come to full resolution over the next 2 weeks or at any point it worsens.  MARCUS Bryan CNP..................6/15/2021 11:24 AM    "

## 2021-06-15 NOTE — NURSING NOTE
"Chief Complaint   Patient presents with     Jaw Pain     left side     Patient presents to clinic with left sided jaw pain. She states that she has been having less pain than she was having, but it is not completely gone.     Initial /84 (BP Location: Right arm, Patient Position: Sitting, Cuff Size: Adult Regular)   Pulse 57   Temp 96.5  F (35.8  C) (Temporal)   Resp 19   Ht 1.588 m (5' 2.5\")   Wt 65.9 kg (145 lb 3.2 oz)   SpO2 98%   BMI 26.13 kg/m   Estimated body mass index is 26.13 kg/m  as calculated from the following:    Height as of this encounter: 1.588 m (5' 2.5\").    Weight as of this encounter: 65.9 kg (145 lb 3.2 oz).         Medication Reconciliation: Complete      Christy Adams   "

## 2021-10-09 ENCOUNTER — ALLIED HEALTH/NURSE VISIT (OUTPATIENT)
Dept: FAMILY MEDICINE | Facility: OTHER | Age: 46
End: 2021-10-09
Attending: FAMILY MEDICINE
Payer: COMMERCIAL

## 2021-10-09 DIAGNOSIS — G44.209 TENSION-TYPE HEADACHE, NOT INTRACTABLE, UNSPECIFIED CHRONICITY PATTERN: ICD-10-CM

## 2021-10-09 DIAGNOSIS — R05.9 COUGH: Primary | ICD-10-CM

## 2021-10-09 PROCEDURE — C9803 HOPD COVID-19 SPEC COLLECT: HCPCS

## 2021-10-09 PROCEDURE — U0005 INFEC AGEN DETEC AMPLI PROBE: HCPCS | Mod: ZL

## 2021-10-09 NOTE — NURSING NOTE
Patient swabbed for COVID-19 testing for cough and headache.  Wilda Lincoln LPN on 10/9/2021 at 2:35 PM

## 2021-10-10 ENCOUNTER — HEALTH MAINTENANCE LETTER (OUTPATIENT)
Age: 46
End: 2021-10-10

## 2021-10-10 LAB — SARS-COV-2 RNA RESP QL NAA+PROBE: POSITIVE

## 2022-05-22 ENCOUNTER — HEALTH MAINTENANCE LETTER (OUTPATIENT)
Age: 47
End: 2022-05-22

## 2022-09-18 ENCOUNTER — HEALTH MAINTENANCE LETTER (OUTPATIENT)
Age: 47
End: 2022-09-18

## 2022-11-04 ENCOUNTER — HOSPITAL ENCOUNTER (EMERGENCY)
Facility: OTHER | Age: 47
Discharge: HOME | End: 2022-11-04
Attending: FAMILY MEDICINE | Admitting: FAMILY MEDICINE
Payer: COMMERCIAL

## 2022-11-04 ENCOUNTER — TELEPHONE (OUTPATIENT)
Dept: FAMILY MEDICINE | Facility: OTHER | Age: 47
End: 2022-11-04

## 2022-11-04 VITALS
TEMPERATURE: 99 F | HEIGHT: 63 IN | OXYGEN SATURATION: 99 % | SYSTOLIC BLOOD PRESSURE: 130 MMHG | DIASTOLIC BLOOD PRESSURE: 82 MMHG | WEIGHT: 140 LBS | BODY MASS INDEX: 24.8 KG/M2 | HEART RATE: 80 BPM | RESPIRATION RATE: 20 BRPM

## 2022-11-04 DIAGNOSIS — U07.1 INFECTION DUE TO 2019 NOVEL CORONAVIRUS: ICD-10-CM

## 2022-11-04 LAB
ANION GAP SERPL CALCULATED.3IONS-SCNC: 10 MMOL/L (ref 3–14)
BUN SERPL-MCNC: 8 MG/DL (ref 7–25)
CALCIUM SERPL-MCNC: 9.2 MG/DL (ref 8.6–10.3)
CHLORIDE BLD-SCNC: 99 MMOL/L (ref 98–107)
CO2 SERPL-SCNC: 29 MMOL/L (ref 21–31)
CREAT SERPL-MCNC: 0.61 MG/DL (ref 0.6–1.2)
GFR SERPL CREATININE-BSD FRML MDRD: >90 ML/MIN/1.73M2
GLUCOSE BLD-MCNC: 106 MG/DL (ref 70–105)
HOLD SPECIMEN: NORMAL
POTASSIUM BLD-SCNC: 3 MMOL/L (ref 3.5–5.1)
SODIUM SERPL-SCNC: 138 MMOL/L (ref 134–144)

## 2022-11-04 PROCEDURE — 36415 COLL VENOUS BLD VENIPUNCTURE: CPT | Performed by: FAMILY MEDICINE

## 2022-11-04 PROCEDURE — 99283 EMERGENCY DEPT VISIT LOW MDM: CPT | Performed by: FAMILY MEDICINE

## 2022-11-04 PROCEDURE — 80048 BASIC METABOLIC PNL TOTAL CA: CPT | Performed by: FAMILY MEDICINE

## 2022-11-04 PROCEDURE — 99282 EMERGENCY DEPT VISIT SF MDM: CPT | Performed by: FAMILY MEDICINE

## 2022-11-04 RX ORDER — LEVOTHYROXINE SODIUM 75 UG/1
75 TABLET ORAL DAILY
COMMUNITY
Start: 2022-10-25 | End: 2024-09-17

## 2022-11-04 ASSESSMENT — ACTIVITIES OF DAILY LIVING (ADL)
ADLS_ACUITY_SCORE: 35
ADLS_ACUITY_SCORE: 35

## 2022-11-04 NOTE — DISCHARGE INSTRUCTIONS
Thank you for choosing our Emergency Department for your care.     You may receive a phone call or letter for a survey about your care in our ED.  Please complete this as this is how we improve care for our patients.     If you have any questions after leaving the ED you can call or text me on my cell phone at [phone] and I will get back to you at some point. This does not mean that I am on call and if you are not doing well please return to the ED.     Sincerely,    Dr Phillip Jaramillo M.D.

## 2022-11-04 NOTE — ED PROVIDER NOTES
"  History     Chief Complaint   Patient presents with     Pharyngitis     Shortness of Breath     Cough     Otalgia     The history is provided by the patient.     Lynn Odom is a 47 year old female who presents to the ED after testing positive for COVID-19 using an at home test on Tuesday. She began noticing cough and sore throat on Monday afternoon and fever/chills on Tuesday. She has lost her voice over these few days and experienced mild nausea without vomiting. She denies GI upset and diarrhea. She is using OTC cough suppressants at home. Patient has been vaccinated 2x against COVID and is interested in receiving pharmacological treatment for her symptoms today.     Allergies:  No Known Allergies    Problem List:    Patient Active Problem List    Diagnosis Date Noted     Infection due to 2019 novel coronavirus 11/04/2022     Priority: Medium        Past Medical History:    History reviewed. No pertinent past medical history.    Past Surgical History:    Past Surgical History:   Procedure Laterality Date     DILATION AND CURETTAGE      2014 in 2016 for abortions.     Partial amputation      First digit right hand, posttraumatic at age 7 years old       Family History:    Family History   Family history unknown: Yes       Social History:  Marital Status:   [2]  Social History     Tobacco Use     Smoking status: Never     Smokeless tobacco: Never   Substance Use Topics     Alcohol use: Yes     Comment: Occ     Drug use: Never        Medications:    levothyroxine (SYNTHROID/LEVOTHROID) 88 MCG tablet  nirmatrelvir and ritonavir (PAXLOVID) therapy pack          Review of Systems   Constitutional: Positive for fever.   HENT: Positive for sore throat.    Respiratory: Positive for cough.    All other systems reviewed and are negative.      Physical Exam   BP: (!) 147/85  Pulse: 82  Temp: 99.1  F (37.3  C)  Resp: 20  Height: 160 cm (5' 3\")  Weight: 63.5 kg (140 lb)  SpO2: 99 %      Physical Exam  Vitals " and nursing note reviewed.   Constitutional:       General: She is not in acute distress.     Appearance: She is well-developed. She is ill-appearing. She is not toxic-appearing or diaphoretic.   HENT:      Right Ear: Tympanic membrane and ear canal normal.      Left Ear: Tympanic membrane and ear canal normal.      Nose: No congestion or rhinorrhea.      Mouth/Throat:      Pharynx: Oropharynx is clear. Uvula midline. Posterior oropharyngeal erythema present. No oropharyngeal exudate.   Cardiovascular:      Rate and Rhythm: Normal rate and regular rhythm.      Heart sounds: Normal heart sounds.   Pulmonary:      Effort: Pulmonary effort is normal. No respiratory distress.      Breath sounds: Normal breath sounds. No stridor.   Abdominal:      General: Bowel sounds are normal.      Palpations: Abdomen is soft.   Skin:     General: Skin is warm and dry.   Neurological:      Mental Status: She is alert.           Assessments & Plan (with Medical Decision Making)   Patient is a 47-year-old female with past medical history including hypothyroidism who presents with cough, fever/chills, sore throat, and mild nausea of 2-3 days duration after testing positive for COVID-19 using an at home test on Tuesday. She has been vaccinated against COVID-19 2x. She presents with pharyngeal erythema without exudate, lungs CTAB, normal heart sounds, and O2 saturations % on RA.     #COVID-19  Patient oxygen saturations % on RA with normal inspiratory effort and lung sounds CTAB. Since patient not  Requiring supplemental O2, considering remdesivir vs paxlovid pending kidney function.  - BMP       I have reviewed the nursing notes.    I have reviewed the findings, diagnosis, plan and need for follow up with the patient.      Final diagnoses:   Infection due to 2019 novel coronavirus     Marino Jaramillo MD on 11/4/2022 at 9:37 AM   11/4/2022   [facility]    I am seeing this patient with Aditya  Chloé, MS 3 from [employer].  I agree with his evaluation, work up, differential and diagnosis.  I did examine the patient in person today.    MD Clint Millard, Marino Cintron MD  11/08/22 0024

## 2022-11-04 NOTE — ED TRIAGE NOTES
Patient comes in from home with symptoms of COVID.  Positive home test three days ago.  She has been unable to sleep, severe sore throat with difficulty swallowing due to burning, reports some SOB especially during coughing fits.  Coughing up yellow  sputum.Fever X 2 days.  Bilateral ear pain.     Triage Assessment     Row Name 11/04/22 0823       Triage Assessment (Adult)    Airway WDL X;airway symptoms       Respiratory WDL    Respiratory WDL X;all    Rhythm/Pattern, Respiratory depth regular;shortness of breath;pattern regular;unlabored    Expansion/Accessory Muscles/Retractions no use of accessory muscles    Nailbeds no discoloration    Mucous Membranes dry;intact    Cough Frequency frequent    Cough Type productive       Skin Circulation/Temperature WDL    Skin Circulation/Temperature WDL WDL       Cardiac WDL    Cardiac WDL WDL       Peripheral/Neurovascular WDL    Peripheral Neurovascular WDL WDL       Cognitive/Neuro/Behavioral WDL    Cognitive/Neuro/Behavioral WDL WDL

## 2022-11-04 NOTE — TELEPHONE ENCOUNTER
Reason for call: Patient wanting a work in appointment.    Is the appointment for a Hospital Follow up?  No     (If yes - Unable to find an appointment with any provider during the time frame needed. Nurse/Provider - Can this patient be worked into a schedule with PCP or team member?)    Patient is having the following symptoms:  ER follow up for 2 day    The patient is requesting an appointment with  Christine    Was an appointment offered for this call? No.  There are no openings in the clinic at the time of this message.  Spouse also states that he has an appointment on Monday at 11:20 so if a work in could be scheduled after that time it would be appreciated.     Preferred method for responding to this message: Telephone Call    Phone number patient can be reached at? Home number on file [phone] (home)    If we can't reach you directly, may we leave a detailed response at the number you provided? Yes    Can this message wait until your PCP/provider returns if unavailable today? No

## 2022-11-04 NOTE — TELEPHONE ENCOUNTER
"Spouse called to report that [facility] refused to fill the Paxlovid for patient.          [facility] will not fill this due to \"No\" under Risk Criteria Met.    Dr. Jaramillo was available to speak with this writer about the Paxlovid.  Provider was updated with [facility]'s response.  Provider was asked if client meets criteria and if so, please submit a new prescription.  Provider reports that patient doesn't meet the criteria.  Provider suggests that patient utilizes OTC medications.    Spouse notified and provided patient with an office visit Monday, 11-7-22, with Tameka RICHTER.      Alejandra Lozano LPN 11/4/2022 4:50 PM.            "

## 2022-11-07 ENCOUNTER — TELEPHONE (OUTPATIENT)
Dept: FAMILY MEDICINE | Facility: OTHER | Age: 47
End: 2022-11-07

## 2022-11-07 NOTE — TELEPHONE ENCOUNTER
Patient was seen in the ed and is scheduled for an ed follow up today at 10:40. Patient tested positive. She still wants to be seen today. Please call and advise if she will have  to reschedule? I mentioned they may prefer she not come in the clinic.  said th ed knew she was positive.    Myranda Ocasio on 11/7/2022 at 7:26 AM

## 2022-11-08 ASSESSMENT — ENCOUNTER SYMPTOMS
COUGH: 1
FEVER: 1
SORE THROAT: 1

## 2022-12-13 ENCOUNTER — OFFICE VISIT (OUTPATIENT)
Dept: FAMILY MEDICINE | Facility: OTHER | Age: 47
End: 2022-12-13
Attending: NURSE PRACTITIONER
Payer: COMMERCIAL

## 2022-12-13 VITALS
HEIGHT: 63 IN | BODY MASS INDEX: 25.34 KG/M2 | TEMPERATURE: 98.5 F | WEIGHT: 143 LBS | SYSTOLIC BLOOD PRESSURE: 122 MMHG | HEART RATE: 88 BPM | OXYGEN SATURATION: 98 % | RESPIRATION RATE: 16 BRPM | DIASTOLIC BLOOD PRESSURE: 80 MMHG

## 2022-12-13 DIAGNOSIS — Z00.00 ROUTINE HISTORY AND PHYSICAL EXAMINATION OF ADULT: Primary | ICD-10-CM

## 2022-12-13 DIAGNOSIS — E05.00 GRAVES DISEASE: ICD-10-CM

## 2022-12-13 DIAGNOSIS — Z12.4 CERVICAL CANCER SCREENING: ICD-10-CM

## 2022-12-13 DIAGNOSIS — Z12.11 COLON CANCER SCREENING: ICD-10-CM

## 2022-12-13 PROCEDURE — 87624 HPV HI-RISK TYP POOLED RSLT: CPT | Mod: ZL | Performed by: NURSE PRACTITIONER

## 2022-12-13 PROCEDURE — 99396 PREV VISIT EST AGE 40-64: CPT | Performed by: NURSE PRACTITIONER

## 2022-12-13 PROCEDURE — G0123 SCREEN CERV/VAG THIN LAYER: HCPCS | Performed by: NURSE PRACTITIONER

## 2022-12-13 ASSESSMENT — ENCOUNTER SYMPTOMS
MYALGIAS: 0
HEMATOCHEZIA: 0
BREAST MASS: 0
PALPITATIONS: 0
COUGH: 0
HEMATURIA: 0
JOINT SWELLING: 0
PARESTHESIAS: 0
CHILLS: 0
HEADACHES: 0
DIZZINESS: 0
HEARTBURN: 0
ARTHRALGIAS: 0
SHORTNESS OF BREATH: 0
NERVOUS/ANXIOUS: 0
SORE THROAT: 0
CONSTIPATION: 0
FREQUENCY: 0
NAUSEA: 0
FEVER: 0
DIARRHEA: 0
WEAKNESS: 0
DYSURIA: 0
ABDOMINAL PAIN: 0
EYE PAIN: 0

## 2022-12-13 ASSESSMENT — PAIN SCALES - GENERAL: PAINLEVEL: NO PAIN (0)

## 2022-12-13 NOTE — PROGRESS NOTES
SUBJECTIVE:   CC: Lynn is an 47 year old who presents for preventive health visit.   Patient has been advised of split billing requirements and indicates understanding: Yes  Healthy Habits:     Getting at least 3 servings of Calcium per day:  Yes    Bi-annual eye exam:  Yes    Dental care twice a year:  Yes    Sleep apnea or symptoms of sleep apnea:  None    Diet:  Regular (no restrictions)    Frequency of exercise:  2-3 days/week    Duration of exercise:  Less than 15 minutes    Taking medications regularly:  Yes    Medication side effects:  None    PHQ-2 Total Score: 0    Additional concerns today:  No    She continues to work with endocrinology for her Graves' disease.  Medication, levothyroxine has been stable.  Labs have been completed through endocrinology without significant changes.  She recently had COVID-19, overall is feeling well.  She is due for colon cancer screening, cervical cancer screening.  No health concerns today.    Today's PHQ-2 Score:   PHQ-2 ( 1999 Pfizer) 12/13/2022   Q1: Little interest or pleasure in doing things 0   Q2: Feeling down, depressed or hopeless 0   PHQ-2 Score 0   PHQ-2 Total Score (12-17 Years)- Positive if 3 or more points; Administer PHQ-A if positive -   Q1: Little interest or pleasure in doing things Not at all   Q2: Feeling down, depressed or hopeless Not at all   PHQ-2 Score 0       Have you ever done Advance Care Planning? (For example, a Health Directive, POLST, or a discussion with a medical provider or your loved ones about your wishes): No, advance care planning information given to patient to review.  Patient declined advance care planning discussion at this time.    Social History     Tobacco Use     Smoking status: Never     Smokeless tobacco: Never   Substance Use Topics     Alcohol use: Yes     Comment: Occ         Alcohol Use 12/13/2022   Prescreen: >3 drinks/day or >7 drinks/week? No       Reviewed orders with patient.  Reviewed health maintenance and  updated orders accordingly - Yes  Labs reviewed in EPIC  BP Readings from Last 3 Encounters:   12/13/22 122/80   11/04/22 130/82   06/15/21 112/84    Wt Readings from Last 3 Encounters:   12/13/22 64.9 kg (143 lb)   11/04/22 63.5 kg (140 lb)   06/15/21 65.9 kg (145 lb 3.2 oz)                  Patient Active Problem List   Diagnosis     Infection due to 2019 novel coronavirus     Graves disease     Past Surgical History:   Procedure Laterality Date     DILATION AND CURETTAGE      2014 in 2016 for abortions.     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 No    FHS-7: No flowsheet data found.    Mammogram Screening: Recommended annual mammography  Pertinent mammograms are reviewed under the imaging tab.    History of abnormal Pap smear: YES - updated in Problem List and Health Maintenance accordingly  PAP / HPV Latest Ref Rng & Units 12/16/2019   PAP (Historical) - NIL   HPV16 NEG:Negative Negative   HPV18 NEG:Negative Negative   HRHPV NEG:Negative Positive(A)     Reviewed and updated as needed this visit by clinical staff   Tobacco  Allergies  Meds              Reviewed and updated as needed this visit by Provider                 History reviewed. No pertinent past medical history.   Past Surgical History:   Procedure Laterality Date     DILATION AND CURETTAGE      2014 in 2016 for abortions.     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   HENT: Negative for congestion, ear pain, hearing loss and sore throat.    Eyes: Negative for pain and visual disturbance.   Respiratory: Negative for cough and shortness of breath.    Cardiovascular: Negative for chest pain, palpitations and peripheral edema.   Gastrointestinal: Negative for abdominal pain, constipation, diarrhea, heartburn, hematochezia and nausea.   Breasts:  Negative for tenderness, breast mass and discharge.   Genitourinary: Negative for dysuria, frequency, genital sores, hematuria, pelvic pain, urgency, vaginal bleeding and vaginal discharge.   Musculoskeletal: Negative for arthralgias, joint swelling and myalgias.   Skin: Negative for rash.   Neurological: Negative for dizziness, weakness, headaches and paresthesias.   Psychiatric/Behavioral: Negative for mood changes. The patient is not nervous/anxious.           OBJECTIVE:   /80   Pulse 88   Temp 98.5  F (36.9  C)   Resp 16   Ht 1.6 m (5' 3\")   Wt 64.9 kg (143 lb)   LMP 12/01/2022   SpO2 98%   BMI 25.33 kg/m    Physical Exam  GENERAL: healthy, alert and no distress  EYES: Eyes grossly normal to inspection, PERRL and conjunctivae and sclerae normal  HENT: ear canals and TM's normal, nose and mouth without ulcers or lesions  NECK: no adenopathy, no asymmetry, masses, or scars and thyroid normal to palpation  RESP: lungs clear to auscultation - no rales, rhonchi or wheezes  BREAST: normal without masses, tenderness or nipple discharge and no palpable axillary masses or adenopathy  CV: regular rate and rhythm, normal S1 S2, no S3 or S4, no murmur, click or rub, no peripheral edema and peripheral pulses strong  ABDOMEN: soft, nontender, no hepatosplenomegaly, no masses and bowel sounds normal   (female): normal female external genitalia, normal urethral meatus, vaginal mucosa pink, moist, well rugated, and normal cervix/adnexa/uterus without masses or discharge.  Pap smear with " "HPV obtained.  MS: no gross musculoskeletal defects noted, no edema  SKIN: no suspicious lesions or rashes  NEURO: Normal strength and tone, mentation intact and speech normal  PSYCH: mentation appears normal, affect normal/bright        ASSESSMENT/PLAN:       ICD-10-CM    1. Routine history and physical examination of adult  Z00.00       2. Colon cancer screening  Z12.11 Adult GI  Referral - Procedure Only      3. Cervical cancer screening  Z12.4 Pap Screen with HPV - recommended age 30 - 65 years     HPV High Risk Types DNA Cervical      4. Graves disease  E05.00         Pap smear with HPV obtained.  We will follow-up with results when available.  Order for colonoscopy placed  Continue with endocrinology for management of Graves' disease  Mammogram scheduled for December 26, 2022.  COVID booster and influenza are up-to-date.    Patient has been advised of split billing requirements and indicates understanding: Yes      COUNSELING:  Reviewed preventive health counseling, as reflected in patient instructions       Regular exercise       Healthy diet/nutrition       Vision screening       Hearing screening       Colorectal Cancer Screening      BMI:   Estimated body mass index is 25.33 kg/m  as calculated from the following:    Height as of this encounter: 1.6 m (5' 3\").    Weight as of this encounter: 64.9 kg (143 lb).   Weight management plan: Discussed healthy diet and exercise guidelines      She reports that she has never smoked. She has never used smokeless tobacco.      MARCUS Bryan [facility] [place]  "

## 2022-12-13 NOTE — NURSING NOTE
Patient presents today for annual physical and pap.    Medication Reconciliation Complete    Comfort Segal LPN  12/13/2022 2:35 PM

## 2022-12-20 LAB
HUMAN PAPILLOMA VIRUS 16 DNA: NEGATIVE
HUMAN PAPILLOMA VIRUS 18 DNA: NEGATIVE
HUMAN PAPILLOMA VIRUS FINAL DIAGNOSIS: NORMAL
HUMAN PAPILLOMA VIRUS OTHER HR: NEGATIVE

## 2022-12-21 LAB
BKR LAB AP GYN ADEQUACY: NORMAL
BKR LAB AP GYN INTERPRETATION: NORMAL
BKR LAB AP HPV REFLEX: NORMAL
BKR LAB AP LMP: NORMAL
BKR LAB AP PREVIOUS ABNL DX: NORMAL
BKR LAB AP PREVIOUS ABNORMAL: NORMAL
PATH REPORT.COMMENTS IMP SPEC: NORMAL
PATH REPORT.COMMENTS IMP SPEC: NORMAL
PATH REPORT.RELEVANT HX SPEC: NORMAL

## 2022-12-26 ENCOUNTER — HOSPITAL ENCOUNTER (OUTPATIENT)
Dept: MAMMOGRAPHY | Facility: OTHER | Age: 47
Discharge: HOME | End: 2022-12-26
Attending: NURSE PRACTITIONER | Admitting: NURSE PRACTITIONER
Payer: COMMERCIAL

## 2022-12-26 DIAGNOSIS — Z12.31 VISIT FOR SCREENING MAMMOGRAM: ICD-10-CM

## 2022-12-26 PROCEDURE — 77067 SCR MAMMO BI INCL CAD: CPT

## 2022-12-28 ENCOUNTER — TELEPHONE (OUTPATIENT)
Dept: FAMILY MEDICINE | Facility: OTHER | Age: 47
End: 2022-12-28

## 2022-12-28 NOTE — TELEPHONE ENCOUNTER
Called and spoke with both patient and wife at that same time. The paperwork specifically says that it needs to be filled out bu a [place] [employer] Surgeon. Discussed with Tameka she said to drop it off and she will take a look at it. They are going to call to immigration and ask specifically where they find a surgeon that is specific to immigration. Sue Mcintosh LPN .......................12/28/2022  1:50 PM

## 2022-12-28 NOTE — TELEPHONE ENCOUNTER
Patients  has some questions regarding Form I-693.  Needs to have this filled out for immigration.     Please call patient back.  Thank you   Yovana Robertson on 12/28/2022 at 1:28 PM

## 2022-12-28 NOTE — TELEPHONE ENCOUNTER
Reason for Call:  Appointment Request    Patient requesting this type of appt:  Immigration physical     Requested provider: Dr. Bedolla    Reason patient unable to be scheduled: Not within requested timeframe    When does patient want to be seen/preferred time: 1-2 weeks    Comments: Patient called back and requesting an appointment with Dr. Bedolla for the I-693 form to be filled out.     Could we send this information to you in [facility] or would you prefer to receive a phone call?:   Patient would prefer a phone call   Okay to leave a detailed message?: Yes at Cell number on file:    Telephone Information:   Mobile [phone]       Call taken on 12/28/2022 at 2:46 PM by Raad Ramey

## 2023-01-04 NOTE — TELEPHONE ENCOUNTER
I really need your help.  Take a lot of time for me to find a spot and you get do not respond to her fasting up.    Have her come in next week at one of the available slots

## 2023-01-19 ENCOUNTER — OFFICE VISIT (OUTPATIENT)
Dept: FAMILY MEDICINE | Facility: OTHER | Age: 48
End: 2023-01-19
Attending: NURSE PRACTITIONER
Payer: COMMERCIAL

## 2023-01-19 VITALS
OXYGEN SATURATION: 97 % | HEIGHT: 63 IN | DIASTOLIC BLOOD PRESSURE: 80 MMHG | WEIGHT: 146 LBS | SYSTOLIC BLOOD PRESSURE: 120 MMHG | BODY MASS INDEX: 25.87 KG/M2 | HEART RATE: 68 BPM | TEMPERATURE: 97.7 F | RESPIRATION RATE: 16 BRPM

## 2023-01-19 DIAGNOSIS — Z51.81 ENCOUNTER FOR THERAPEUTIC DRUG MONITORING: ICD-10-CM

## 2023-01-19 DIAGNOSIS — Z22.7 LATENT TUBERCULOSIS BY BLOOD TEST: Primary | ICD-10-CM

## 2023-01-19 LAB
ALBUMIN SERPL BCG-MCNC: 4.2 G/DL (ref 3.5–5.2)
ALP SERPL-CCNC: 94 U/L (ref 35–104)
ALT SERPL W P-5'-P-CCNC: 14 U/L (ref 10–35)
AST SERPL W P-5'-P-CCNC: 13 U/L (ref 10–35)
BASOPHILS # BLD AUTO: 0 10E3/UL (ref 0–0.2)
BASOPHILS NFR BLD AUTO: 1 %
BILIRUB DIRECT SERPL-MCNC: <0.2 MG/DL (ref 0–0.3)
BILIRUB SERPL-MCNC: 0.3 MG/DL
EOSINOPHIL # BLD AUTO: 0.1 10E3/UL (ref 0–0.7)
EOSINOPHIL NFR BLD AUTO: 3 %
ERYTHROCYTE [DISTWIDTH] IN BLOOD BY AUTOMATED COUNT: 12.6 % (ref 10–15)
HCT VFR BLD AUTO: 37 % (ref 35–47)
HGB BLD-MCNC: 12.4 G/DL (ref 11.7–15.7)
IMM GRANULOCYTES # BLD: 0 10E3/UL
IMM GRANULOCYTES NFR BLD: 0 %
LYMPHOCYTES # BLD AUTO: 1.7 10E3/UL (ref 0.8–5.3)
LYMPHOCYTES NFR BLD AUTO: 35 %
MCH RBC QN AUTO: 28.1 PG (ref 26.5–33)
MCHC RBC AUTO-ENTMCNC: 33.5 G/DL (ref 31.5–36.5)
MCV RBC AUTO: 84 FL (ref 78–100)
MONOCYTES # BLD AUTO: 0.6 10E3/UL (ref 0–1.3)
MONOCYTES NFR BLD AUTO: 12 %
NEUTROPHILS # BLD AUTO: 2.6 10E3/UL (ref 1.6–8.3)
NEUTROPHILS NFR BLD AUTO: 49 %
NRBC # BLD AUTO: 0 10E3/UL
NRBC BLD AUTO-RTO: 0 /100
PLATELET # BLD AUTO: 293 10E3/UL (ref 150–450)
PROT SERPL-MCNC: 7.2 G/DL (ref 6.4–8.3)
RBC # BLD AUTO: 4.41 10E6/UL (ref 3.8–5.2)
WBC # BLD AUTO: 5.1 10E3/UL (ref 4–11)

## 2023-01-19 PROCEDURE — 80076 HEPATIC FUNCTION PANEL: CPT | Mod: ZL | Performed by: NURSE PRACTITIONER

## 2023-01-19 PROCEDURE — 99214 OFFICE O/P EST MOD 30 MIN: CPT | Performed by: NURSE PRACTITIONER

## 2023-01-19 PROCEDURE — 36415 COLL VENOUS BLD VENIPUNCTURE: CPT | Mod: ZL | Performed by: NURSE PRACTITIONER

## 2023-01-19 PROCEDURE — 86803 HEPATITIS C AB TEST: CPT | Mod: ZL | Performed by: NURSE PRACTITIONER

## 2023-01-19 PROCEDURE — 87389 HIV-1 AG W/HIV-1&-2 AB AG IA: CPT | Mod: ZL | Performed by: NURSE PRACTITIONER

## 2023-01-19 PROCEDURE — 85004 AUTOMATED DIFF WBC COUNT: CPT | Mod: ZL | Performed by: NURSE PRACTITIONER

## 2023-01-19 RX ORDER — RIFAMPIN 300 MG/1
300 CAPSULE ORAL DAILY
Qty: 120 CAPSULE | Refills: 0 | Status: SHIPPED | OUTPATIENT
Start: 2023-01-19 | End: 2023-01-20

## 2023-01-19 ASSESSMENT — PAIN SCALES - GENERAL: PAINLEVEL: NO PAIN (0)

## 2023-01-19 NOTE — PROGRESS NOTES
Assessment & Plan   Problem List Items Addressed This Visit    None  Visit Diagnoses     Latent tuberculosis by blood test    -  Primary    Relevant Medications    rifampin (RIFADIN) 300 MG capsule    Other Relevant Orders    HIV Antigen Antibody Combo    Hepatitis C Screen Reflex to HCV RNA Quant and Genotype    Hepatic Function Panel (Completed)    CBC and Differential (Completed)    Encounter for therapeutic drug monitoring        Relevant Medications    rifampin (RIFADIN) 300 MG capsule    Other Relevant Orders    Hepatic Function Panel (Completed)    CBC and Differential (Completed)         I did review her records, labs and chest x-ray from 1/5/2023 where she had her immigration exam.  We discussed treatment options including rifapentine and isoniazid weekly for 12 weeks with daily pyrdoxine or rifampin daily for 4 months.  Either way she will need baseline labs today and monitoring of LFTs every 4 weeks while on treatment.  Her  have decided to simplify this by doing a daily treatment for 4 months. Rifampen 10 mg/kg daily for 4 months was prescribed per CDC and up-to-date guidelines.  Liver functions, CBC completed today and stable.  Lab orders placed for lab only follow-up again every 4 weeks x 2 and then at the end of 4 months she should follow back up with myself in clinic.    Review of the result(s) of each unique test - CBC, CMP  Ordering of each unique test  Prescription drug management  34 minutes spent on the date of the encounter doing chart review, history and exam, documentation and further activities per the note           No follow-ups on file.    MARCUS [facility] AND \[facility]\""    Kayden Bailey is a 47 year old accompanied by her spouse, presenting for the following health issues:  Medication Therapy Management      History of Present Illness       Reason for visit:  Discuss treatment plan for Latent TB    She eats 2-3 servings of fruits and vegetables  "daily.She consumes 0 sweetened beverage(s) daily.She exercises with enough effort to increase her heart rate 20 to 29 minutes per day.  She exercises with enough effort to increase her heart rate 5 days per week.   She is taking medications regularly.     She comes in today to discuss treatment for latent TB.  She had recent evaluation for immigration, QuantiFERON gold test was positive with elevated TB 2 Ag.  This has changed from her QuantiFERON gold test 3 years ago.  She did have chest x-ray which was stable.  It was recommended that she be seen for treatment.  She is not currently having any symptoms.    Review of Systems   See above      Objective    /80   Pulse 68   Temp 97.7  F (36.5  C)   Resp 16   Ht 1.6 m (5' 3\")   Wt 66.2 kg (146 lb)   LMP 12/01/2022   SpO2 97%   BMI 25.86 kg/m    Body mass index is 25.86 kg/m .  Physical Exam   GENERAL: healthy, alert and no distress  EYES: Eyes grossly normal to inspection  RESP: lungs clear to auscultation - no rales, rhonchi or wheezes  CV: regular rate and rhythm, normal S1 S2  NEURO: Normal strength and tone, mentation intact and speech normal  PSYCH: mentation appears normal, affect normal/bright    Results for orders placed or performed in visit on 01/19/23 (from the past 24 hour(s))   Hepatic Function Panel   Result Value Ref Range    Protein Total 7.2 6.4 - 8.3 g/dL    Albumin 4.2 3.5 - 5.2 g/dL    Bilirubin Total 0.3 <=1.2 mg/dL    Alkaline Phosphatase 94 35 - 104 U/L    AST 13 10 - 35 U/L    ALT 14 10 - 35 U/L    Bilirubin Direct <0.20 0.00 - 0.30 mg/dL   CBC and Differential    Narrative    The following orders were created for panel order CBC and Differential.  Procedure                               Abnormality         Status                     ---------                               -----------         ------                     CBC with platelets and d...[[id number]]                      Final result                 Please view results for " these tests on the individual orders.   CBC with platelets and differential   Result Value Ref Range    WBC Count 5.1 4.0 - 11.0 10e3/uL    RBC Count 4.41 3.80 - 5.20 10e6/uL    Hemoglobin 12.4 11.7 - 15.7 g/dL    Hematocrit 37.0 35.0 - 47.0 %    MCV 84 78 - 100 fL    MCH 28.1 26.5 - 33.0 pg    MCHC 33.5 31.5 - 36.5 g/dL    RDW 12.6 10.0 - 15.0 %    Platelet Count 293 150 - 450 10e3/uL    % Neutrophils 49 %    % Lymphocytes 35 %    % Monocytes 12 %    % Eosinophils 3 %    % Basophils 1 %    % Immature Granulocytes 0 %    NRBCs per 100 WBC 0 <1 /100    Absolute Neutrophils 2.6 1.6 - 8.3 10e3/uL    Absolute Lymphocytes 1.7 0.8 - 5.3 10e3/uL    Absolute Monocytes 0.6 0.0 - 1.3 10e3/uL    Absolute Eosinophils 0.1 0.0 - 0.7 10e3/uL    Absolute Basophils 0.0 0.0 - 0.2 10e3/uL    Absolute Immature Granulocytes 0.0 <=0.4 10e3/uL    Absolute NRBCs 0.0 10e3/uL

## 2023-01-19 NOTE — NURSING NOTE
Patient presents today to discuss treatment for latent TB.    Medication Reconciliation Complete    Comfort Segal LPN  1/19/2023 1:05 PM

## 2023-01-20 ENCOUNTER — TELEPHONE (OUTPATIENT)
Dept: FAMILY MEDICINE | Facility: OTHER | Age: 48
End: 2023-01-20
Payer: COMMERCIAL

## 2023-01-20 DIAGNOSIS — Z22.7 TB LUNG, LATENT: Primary | ICD-10-CM

## 2023-01-20 RX ORDER — RIFAMPIN 300 MG/1
600 CAPSULE ORAL DAILY
Qty: 240 CAPSULE | Refills: 0 | Status: SHIPPED | OUTPATIENT
Start: 2023-01-20 | End: 2024-01-18

## 2023-01-20 NOTE — TELEPHONE ENCOUNTER
Nelia madden [facility] called regarding the prescription for rifampin. The dosing should be two capsules per day. Have Tameka King double check. Please call.    Carole Zuniga on 1/20/2023 at 9:42 AM

## 2023-01-21 LAB
HCV AB SERPL QL IA: NONREACTIVE
HIV 1+2 AB+HIV1 P24 AG SERPL QL IA: NONREACTIVE

## 2023-02-19 ENCOUNTER — MYC MEDICAL ADVICE (OUTPATIENT)
Dept: FAMILY MEDICINE | Facility: OTHER | Age: 48
End: 2023-02-19
Payer: COMMERCIAL

## 2023-02-20 ENCOUNTER — LAB (OUTPATIENT)
Dept: LAB | Facility: OTHER | Age: 48
End: 2023-02-20
Payer: COMMERCIAL

## 2023-02-20 DIAGNOSIS — Z22.7 LATENT TUBERCULOSIS BY BLOOD TEST: ICD-10-CM

## 2023-02-20 DIAGNOSIS — Z51.81 ENCOUNTER FOR THERAPEUTIC DRUG MONITORING: ICD-10-CM

## 2023-02-20 LAB
ALBUMIN SERPL BCG-MCNC: 4.2 G/DL (ref 3.5–5.2)
ALP SERPL-CCNC: 103 U/L (ref 35–104)
ALT SERPL W P-5'-P-CCNC: 11 U/L (ref 10–35)
AST SERPL W P-5'-P-CCNC: 16 U/L (ref 10–35)
BILIRUB DIRECT SERPL-MCNC: <0.2 MG/DL (ref 0–0.3)
BILIRUB SERPL-MCNC: 0.4 MG/DL
PROT SERPL-MCNC: 7.3 G/DL (ref 6.4–8.3)

## 2023-02-20 PROCEDURE — 36415 COLL VENOUS BLD VENIPUNCTURE: CPT | Mod: ZL

## 2023-02-20 PROCEDURE — 80076 HEPATIC FUNCTION PANEL: CPT | Mod: ZL

## 2023-12-06 DIAGNOSIS — Z12.11 ENCOUNTER FOR SCREENING COLONOSCOPY: Primary | ICD-10-CM

## 2023-12-06 NOTE — TELEPHONE ENCOUNTER
Screening Questions for the Scheduling of Screening Colonoscopies   (If Colonoscopy is diagnostic, Provider should review the chart before scheduling.)  Are you younger than 45 or older than 80?  NO  Do you take aspirin or fish oil?  NO (if yes, tell patient to stop 1 week prior to Colonoscopy)  Do you take warfarin (Coumadin), clopidogrel (Plavix), apixaban (Eliquis), dabigatram (Pradaxa), rivaroxaban (Xarelto) or any blood thinner? NO  Do you take Ozempic? NO  Do you use oxygen or a CPAP at home?  NO  Do you have kidney disease? NO  Are you on dialysis? NO  Have you had a stroke or heart attack in the last year? NO  Have you had a stent in your heart or any blood vessel in the last year? NO  Have you had a transplant of any organ? NO  Have you had a colonoscopy or upper endoscopy (EGD) before? NO  Date of scheduled Colonoscopy. 01/25/2024  Provider YAN  Pharmacy [facility]

## 2023-12-07 RX ORDER — BISACODYL 5 MG/1
TABLET, DELAYED RELEASE ORAL
Qty: 2 TABLET | Refills: 0 | Status: ON HOLD | OUTPATIENT
Start: 2024-01-18 | End: 2024-01-25

## 2023-12-07 RX ORDER — POLYETHYLENE GLYCOL 3350, SODIUM CHLORIDE, SODIUM BICARBONATE, POTASSIUM CHLORIDE 420; 11.2; 5.72; 1.48 G/4L; G/4L; G/4L; G/4L
4000 POWDER, FOR SOLUTION ORAL ONCE
Qty: 4000 ML | Refills: 0 | Status: SHIPPED | OUTPATIENT
Start: 2024-01-18 | End: 2024-01-18

## 2024-01-25 ENCOUNTER — HOSPITAL ENCOUNTER (OUTPATIENT)
Facility: OTHER | Age: 49
Discharge: HOME | End: 2024-01-25
Attending: SURGERY | Admitting: SURGERY
Payer: COMMERCIAL

## 2024-01-25 ENCOUNTER — ANESTHESIA (OUTPATIENT)
Dept: SURGERY | Facility: OTHER | Age: 49
End: 2024-01-25
Payer: COMMERCIAL

## 2024-01-25 ENCOUNTER — ANESTHESIA EVENT (OUTPATIENT)
Dept: SURGERY | Facility: OTHER | Age: 49
End: 2024-01-25
Payer: COMMERCIAL

## 2024-01-25 VITALS
BODY MASS INDEX: 25.69 KG/M2 | SYSTOLIC BLOOD PRESSURE: 125 MMHG | HEART RATE: 68 BPM | WEIGHT: 145 LBS | DIASTOLIC BLOOD PRESSURE: 82 MMHG | TEMPERATURE: 97.2 F | RESPIRATION RATE: 18 BRPM | OXYGEN SATURATION: 97 %

## 2024-01-25 PROCEDURE — 258N000003 HC RX IP 258 OP 636: Performed by: SURGERY

## 2024-01-25 PROCEDURE — 250N000009 HC RX 250: Performed by: NURSE ANESTHETIST, CERTIFIED REGISTERED

## 2024-01-25 PROCEDURE — G0121 COLON CA SCRN NOT HI RSK IND: HCPCS | Performed by: SURGERY

## 2024-01-25 PROCEDURE — 45378 DIAGNOSTIC COLONOSCOPY: CPT | Performed by: SURGERY

## 2024-01-25 PROCEDURE — 250N000011 HC RX IP 250 OP 636: Performed by: NURSE ANESTHETIST, CERTIFIED REGISTERED

## 2024-01-25 PROCEDURE — 45378 DIAGNOSTIC COLONOSCOPY: CPT | Performed by: NURSE ANESTHETIST, CERTIFIED REGISTERED

## 2024-01-25 RX ORDER — LIDOCAINE 40 MG/G
CREAM TOPICAL
Status: DISCONTINUED | OUTPATIENT
Start: 2024-01-25 | End: 2024-01-25 | Stop reason: HOSPADM

## 2024-01-25 RX ORDER — FLUMAZENIL 0.1 MG/ML
0.2 INJECTION, SOLUTION INTRAVENOUS
Status: DISCONTINUED | OUTPATIENT
Start: 2024-01-25 | End: 2024-01-25 | Stop reason: HOSPADM

## 2024-01-25 RX ORDER — NALOXONE HYDROCHLORIDE 0.4 MG/ML
0.2 INJECTION, SOLUTION INTRAMUSCULAR; INTRAVENOUS; SUBCUTANEOUS
Status: DISCONTINUED | OUTPATIENT
Start: 2024-01-25 | End: 2024-01-25 | Stop reason: HOSPADM

## 2024-01-25 RX ORDER — SODIUM CHLORIDE, SODIUM LACTATE, POTASSIUM CHLORIDE, CALCIUM CHLORIDE 600; 310; 30; 20 MG/100ML; MG/100ML; MG/100ML; MG/100ML
INJECTION, SOLUTION INTRAVENOUS CONTINUOUS
Status: DISCONTINUED | OUTPATIENT
Start: 2024-01-25 | End: 2024-01-25 | Stop reason: HOSPADM

## 2024-01-25 RX ORDER — NALOXONE HYDROCHLORIDE 0.4 MG/ML
0.4 INJECTION, SOLUTION INTRAMUSCULAR; INTRAVENOUS; SUBCUTANEOUS
Status: DISCONTINUED | OUTPATIENT
Start: 2024-01-25 | End: 2024-01-25 | Stop reason: HOSPADM

## 2024-01-25 RX ORDER — PROPOFOL 10 MG/ML
INJECTION, EMULSION INTRAVENOUS CONTINUOUS PRN
Status: DISCONTINUED | OUTPATIENT
Start: 2024-01-25 | End: 2024-01-25

## 2024-01-25 RX ORDER — PROPOFOL 10 MG/ML
INJECTION, EMULSION INTRAVENOUS PRN
Status: DISCONTINUED | OUTPATIENT
Start: 2024-01-25 | End: 2024-01-25

## 2024-01-25 RX ORDER — LIDOCAINE HYDROCHLORIDE 20 MG/ML
INJECTION, SOLUTION INFILTRATION; PERINEURAL PRN
Status: DISCONTINUED | OUTPATIENT
Start: 2024-01-25 | End: 2024-01-25

## 2024-01-25 RX ADMIN — LIDOCAINE HYDROCHLORIDE 40 MG: 20 INJECTION, SOLUTION INFILTRATION; PERINEURAL at 12:16

## 2024-01-25 RX ADMIN — SODIUM CHLORIDE, POTASSIUM CHLORIDE, SODIUM LACTATE AND CALCIUM CHLORIDE: 600; 310; 30; 20 INJECTION, SOLUTION INTRAVENOUS at 10:51

## 2024-01-25 RX ADMIN — PROPOFOL 100 MG: 10 INJECTION, EMULSION INTRAVENOUS at 12:16

## 2024-01-25 RX ADMIN — PROPOFOL 150 MCG/KG/MIN: 10 INJECTION, EMULSION INTRAVENOUS at 12:16

## 2024-01-25 ASSESSMENT — ACTIVITIES OF DAILY LIVING (ADL)
ADLS_ACUITY_SCORE: 31
ADLS_ACUITY_SCORE: 31

## 2024-01-25 NOTE — H&P
PRE-PROCEDURE NOTE    CHIEFCOMPLAINT / REASON FOR PROCEDURE:  Screening for polyps and colorectal cancer.    PERTINENT HISTORY   Patient is due for colonoscopy. Previous colonoscopy - None. No family history of colon polyps or colon cancer.    History reviewed. No pertinent past medical history.    Past Surgical History:   Procedure Laterality Date    DILATION AND CURETTAGE      2014 in 2016 for abortions.    Partial amputation      First digit right hand, posttraumatic at age 7 years old         Other:  None  Bleeding tendencies: No    Relevant Family History:  None     Relevant Social History:  None     10 point ROS of systems including Constitutional, Eyes, Respiratory, Cardiovascular, Gastroenterology, Genitourinary, Integumentary, Muscularskeletal, Psychiatric were all negative except for pertinent positives noted in my HPI.      ALLERGIES/SENSITIVITIES: No Known Allergies     CURRENT MEDICATIONS:    No current facility-administered medications on file prior to encounter.  levothyroxine (SYNTHROID/LEVOTHROID) 75 MCG tablet, Take 75 mcg by mouth daily Patient takes medication 5 days a week            PRE-SEDATION ASSESSMENT:    LUNGS:  CTA B/L, no wheezing or crackles.  Heart & CV:  RRR no murmur.  Intact distal pulses, good cap refill.    Comment(s):      IMPRESSION: 48 year old female in need of screening colonoscopy.    PLAN:  I discussed screening colonoscopy with the patient. Anesthesia coverage requested.    Charlie King MD    1/25/2024 11:58 AM

## 2024-01-25 NOTE — OR NURSING
Lynn has been discharged to home at 1400 via ambulatory accompanied by spouse,Rd.    Written discharge instructions were provided to patient and spouse.  Prescriptions were none.      Patient and adult caring for them verbalize understanding of discharge instructions including no driving until tomorrow and no longer taking narcotic pain medications - no operating mechanical equipment and no making any important decisions.They understand reason for discharge, and necessary follow-up appointments.

## 2024-01-25 NOTE — ANESTHESIA PREPROCEDURE EVALUATION
Anesthesia Pre-Procedure Evaluation    Patient: Lynn Odom   MRN: [id number] : 1975        Procedure : Procedure(s):  Colonoscopy          History reviewed. No pertinent past medical history.   Past Surgical History:   Procedure Laterality Date    DILATION AND CURETTAGE      2014 in 2016 for abortions.    Partial amputation      First digit right hand, posttraumatic at age 7 years old      No Known Allergies   Social History     Tobacco Use    Smoking status: Never    Smokeless tobacco: Never   Substance Use Topics    Alcohol use: Yes     Comment: Occ      Wt Readings from Last 1 Encounters:   24 65.8 kg (145 lb)        Anesthesia Evaluation   Pt has had prior anesthetic.     No history of anesthetic complications       ROS/MED HX  ENT/Pulmonary:  - neg pulmonary ROS     Neurologic:  - neg neurologic ROS     Cardiovascular:       METS/Exercise Tolerance: >4 METS    Hematologic:  - neg hematologic  ROS     Musculoskeletal:  - neg musculoskeletal ROS     GI/Hepatic:     (+)        bowel prep,            Renal/Genitourinary:  - neg Renal ROS     Endo:     (+)          thyroid problem, hypothyroidism,           Psychiatric/Substance Use:  - neg psychiatric ROS     Infectious Disease:  - neg infectious disease ROS     Malignancy:  - neg malignancy ROS     Other:  - neg other ROS          Physical Exam    Airway        Mallampati: II   TM distance: > 3 FB   Neck ROM: full   Mouth opening: > 3 cm    Respiratory Devices and Support         Dental       (+) Completely normal teeth      Cardiovascular   cardiovascular exam normal       Rhythm and rate: regular and normal     Pulmonary   pulmonary exam normal        breath sounds clear to auscultation           OUTSIDE LABS:  CBC:   Lab Results   Component Value Date    WBC 5.1 2023    WBC 6.8 2021    HGB 12.4 2023    HGB 12.4 2021    HCT 37.0 2023    HCT 37.6 2021     2023     2021     BMP:  "  Lab Results   Component Value Date     11/04/2022     12/16/2019    POTASSIUM 3.0 (L) 11/04/2022    POTASSIUM 3.5 12/16/2019    CHLORIDE 99 11/04/2022    CHLORIDE 101 12/16/2019    CO2 29 11/04/2022    CO2 27 12/16/2019    BUN 8 11/04/2022    BUN 11 12/16/2019    CR 0.61 11/04/2022    CR 0.66 12/16/2019     (H) 11/04/2022     (H) 12/16/2019     COAGS: No results found for: \"PTT\", \"INR\", \"FIBR\"  POC: No results found for: \"BGM\", \"HCG\", \"HCGS\"  HEPATIC:   Lab Results   Component Value Date    ALBUMIN 4.2 02/20/2023    PROTTOTAL 7.3 02/20/2023    ALT 11 02/20/2023    AST 16 02/20/2023    ALKPHOS 103 02/20/2023    BILITOTAL 0.4 02/20/2023     OTHER:   Lab Results   Component Value Date    SUNI 9.2 11/04/2022    T4 3.32 (H) 04/27/2020       Anesthesia Plan    ASA Status:  2    NPO Status:  NPO Appropriate    Anesthesia Type: MAC.              Consents    Anesthesia Plan(s) and associated risks, benefits, and realistic alternatives discussed. Questions answered and patient/representative(s) expressed understanding.     - Discussed: Risks, Benefits and Alternatives for BOTH SEDATION and the PROCEDURE were discussed     - Discussed with:  Patient      - Extended Intubation/Ventilatory Support Discussed: No.      - Patient is DNR/DNI Status: No     Use of blood products discussed: No .     Postoperative Care            Comments:               Adi Yuen, MARCUS ALVARADO    I have reviewed the pertinent notes and labs in the chart from the past 30 days and (re)examined the patient.  Any updates or changes from those notes are reflected in this note.                  "

## 2024-01-25 NOTE — ANESTHESIA CARE TRANSFER NOTE
Patient: Lynn Odom    Procedure: Procedure(s):  Colonoscopy       Diagnosis: Screen for colon cancer [Z12.11]  Diagnosis Additional Information: No value filed.    Anesthesia Type:   MAC     Note:    Oropharynx: oropharynx clear of all foreign objects  Level of Consciousness: awake  Oxygen Supplementation: room air    Independent Airway: airway patency satisfactory and stable  Dentition: dentition unchanged  Vital Signs Stable: post-procedure vital signs reviewed and stable  Report to RN Given: handoff report given  Patient transferred to: Phase II    Handoff Report: Identifed the Patient, Identified the Reponsible Provider, Reviewed the pertinent medical history, Discussed the surgical course, Reviewed Intra-OP anesthesia mangement and issues during anesthesia, Set expectations for post-procedure period and Allowed opportunity for questions and acknowledgement of understanding      Vitals:  Vitals Value Taken Time   BP     Temp     Pulse     Resp     SpO2         Electronically Signed By: MARCUS LEWIS CRNA  January 25, 2024  12:51 PM

## 2024-01-25 NOTE — ANESTHESIA POSTPROCEDURE EVALUATION
Patient: Lynn Odom    Procedure: Procedure(s):  Colonoscopy       Anesthesia Type:  MAC    Note:  Disposition: Outpatient   Postop Pain Control: Uneventful            Sign Out: Well controlled pain   PONV: No   Neuro/Psych: Uneventful            Sign Out: Acceptable/Baseline neuro status   Airway/Respiratory: Uneventful            Sign Out: Acceptable/Baseline resp. status   CV/Hemodynamics: Uneventful            Sign Out: Acceptable CV status; No obvious hypovolemia; No obvious fluid overload   Other NRE: NONE   DID A NON-ROUTINE EVENT OCCUR?            Last vitals:  Vitals Value Taken Time   /77 01/25/24 1249   Temp 97.2  F (36.2  C) 01/25/24 1249   Pulse 67 01/25/24 1249   Resp 18 01/25/24 1249   SpO2 95 % 01/25/24 1249       Electronically Signed By: MARCUS Wren CRNA  January 25, 2024  1:02 PM

## 2024-01-25 NOTE — DISCHARGE INSTRUCTIONS
.[facility]  Adult Discharge Orders & Instructions    ________________________________________________________________          For 12 hours after surgery  Get plenty of rest.  A responsible adult must stay with you for at least 12 hours after you leave the hospital.   You may feel lightheaded.  IF so, sit for a few minutes before standing.  Have someone help you get up.   You may have a slight fever. Call the doctor if your fever is over 101 F (38.3 C) (taken under the tongue) or lasts longer than 24 hours.  You may have a dry mouth, a sore throat, muscle aches or trouble sleeping.  These should go away after 24 hours.  Do not make important or legal decisions.  6.   Do not drive or use heavy equipment.  If you have weakness or tingling, don't drive or use heavy equipment until this feeling goes away.    To contact a doctor, call   [phone]_______________________

## 2024-01-25 NOTE — OP NOTE
PROCEDURE NOTE    SURGEON:Charlie King MD    PRE-OP DIAGNOSIS:  Screening Colonoscopy      POST-OP DIAGNOSIS: Normal    PROCEDURE:  Colonoscopy    SPECIMEN:      * No specimens in log *    ANESTHESIA:  MAC CRNA Independent: Trini Trotter APRN CRNA   Coverage requested    ESTIMATED BLOOD LOSS: none    COMPLICATIONS:  None    INDICATION FOR THE PROCEDURE: The patient is a 48 year old female. The patient presents with need for screening. I explained to the patient the risks, benefits and alternatives to screening colonoscopy for evaluating for cancer or polyps. We discussed the risks including bleeding, perforation, potential inability to reach the cecum and the risks of sedation. The patient's questions were answered and the patient wished to proceed. Informed consent paperwork was completed.    PROCEDURE: The patient was taken to the endoscopy suite. Appropriate monitors were attached. The patient was placed in the left lateral decubitus position. Timeout was performed confirming the patient's identity and procedure to be performed.  After appropriate sedation was confirmed, digital rectal exam was performed.  There was normal tone and no gross abnormality was noted.  The lubricated colonoscope was introduced into the anus the colon was insufflated with air. The prep quality was adequate. Under direct visualization the scope was advanced to the cecum. The mucosa of the colon was inspected while withdrawing the scope.  No abnormalities were noted. The scope was retroflexed in the rectum and the anorectal junction was inspected. No abnormalities were noted. The scope was returned to a neutral position and the colon was decompressed. The scope was removed. The patient tolerated the procedure with no immediately apparent complication. The patient was taken to recovery in stable condition.    FOLLOW UP: RECOMMEND high fiber diet. 10 yr follow up    Charlie King MD on 1/25/2024 at 12:57 PM

## 2024-02-25 ENCOUNTER — HEALTH MAINTENANCE LETTER (OUTPATIENT)
Age: 49
End: 2024-02-25

## 2024-07-20 ENCOUNTER — APPOINTMENT (OUTPATIENT)
Dept: CT IMAGING | Facility: OTHER | Age: 49
End: 2024-07-20
Attending: PHYSICIAN ASSISTANT
Payer: COMMERCIAL

## 2024-07-20 ENCOUNTER — HOSPITAL ENCOUNTER (EMERGENCY)
Facility: OTHER | Age: 49
Discharge: HOME | End: 2024-07-20
Attending: PHYSICIAN ASSISTANT
Payer: COMMERCIAL

## 2024-07-20 ENCOUNTER — APPOINTMENT (OUTPATIENT)
Dept: GENERAL RADIOLOGY | Facility: OTHER | Age: 49
End: 2024-07-20
Attending: PHYSICIAN ASSISTANT
Payer: COMMERCIAL

## 2024-07-20 VITALS
DIASTOLIC BLOOD PRESSURE: 88 MMHG | RESPIRATION RATE: 17 BRPM | HEART RATE: 80 BPM | BODY MASS INDEX: 25.69 KG/M2 | WEIGHT: 145 LBS | TEMPERATURE: 97.5 F | OXYGEN SATURATION: 100 % | SYSTOLIC BLOOD PRESSURE: 144 MMHG | HEIGHT: 63 IN

## 2024-07-20 DIAGNOSIS — R79.89 ELEVATED LACTIC ACID LEVEL: ICD-10-CM

## 2024-07-20 DIAGNOSIS — R73.03 PREDIABETES: ICD-10-CM

## 2024-07-20 DIAGNOSIS — N39.0 UTI (URINARY TRACT INFECTION): ICD-10-CM

## 2024-07-20 DIAGNOSIS — R42 VERTIGO: ICD-10-CM

## 2024-07-20 DIAGNOSIS — R11.2 NAUSEA AND VOMITING: ICD-10-CM

## 2024-07-20 LAB
ALBUMIN SERPL BCG-MCNC: 4.3 G/DL (ref 3.5–5.2)
ALBUMIN UR-MCNC: NEGATIVE MG/DL
ALP SERPL-CCNC: 72 U/L (ref 40–150)
ALT SERPL W P-5'-P-CCNC: 16 U/L (ref 0–50)
ANION GAP SERPL CALCULATED.3IONS-SCNC: 12 MMOL/L (ref 7–15)
APPEARANCE UR: CLEAR
AST SERPL W P-5'-P-CCNC: 14 U/L (ref 0–45)
BACTERIA #/AREA URNS HPF: ABNORMAL /HPF
BASOPHILS # BLD AUTO: 0 10E3/UL (ref 0–0.2)
BASOPHILS NFR BLD AUTO: 1 %
BILIRUB SERPL-MCNC: 0.4 MG/DL
BILIRUB UR QL STRIP: NEGATIVE
BUN SERPL-MCNC: 7.7 MG/DL (ref 6–20)
CALCIUM SERPL-MCNC: 8.7 MG/DL (ref 8.8–10.4)
CHLORIDE SERPL-SCNC: 99 MMOL/L (ref 98–107)
COLOR UR AUTO: YELLOW
CREAT SERPL-MCNC: 0.59 MG/DL (ref 0.51–0.95)
EGFRCR SERPLBLD CKD-EPI 2021: >90 ML/MIN/1.73M2
EOSINOPHIL # BLD AUTO: 0 10E3/UL (ref 0–0.7)
EOSINOPHIL NFR BLD AUTO: 0 %
ERYTHROCYTE [DISTWIDTH] IN BLOOD BY AUTOMATED COUNT: 13.7 % (ref 10–15)
ETHANOL SERPL-MCNC: <0.01 G/DL
FLUAV RNA SPEC QL NAA+PROBE: NEGATIVE
FLUBV RNA RESP QL NAA+PROBE: NEGATIVE
GLUCOSE SERPL-MCNC: 157 MG/DL (ref 70–99)
GLUCOSE UR STRIP-MCNC: 30 MG/DL
HBA1C MFR BLD: 6.3 % (ref 4–6.2)
HCG INTACT+B SERPL-ACNC: <1 MIU/ML
HCO3 SERPL-SCNC: 23 MMOL/L (ref 22–29)
HCT VFR BLD AUTO: 35.9 % (ref 35–47)
HGB BLD-MCNC: 11.6 G/DL (ref 11.7–15.7)
HGB UR QL STRIP: ABNORMAL
IMM GRANULOCYTES # BLD: 0 10E3/UL
IMM GRANULOCYTES NFR BLD: 0 %
KETONES UR STRIP-MCNC: NEGATIVE MG/DL
LACTATE SERPL-SCNC: 2.3 MMOL/L (ref 0.7–2)
LACTATE SERPL-SCNC: 2.6 MMOL/L (ref 0.7–2)
LEUKOCYTE ESTERASE UR QL STRIP: ABNORMAL
LYMPHOCYTES # BLD AUTO: 1 10E3/UL (ref 0.8–5.3)
LYMPHOCYTES NFR BLD AUTO: 20 %
MAGNESIUM SERPL-MCNC: 2 MG/DL (ref 1.7–2.3)
MCH RBC QN AUTO: 26.2 PG (ref 26.5–33)
MCHC RBC AUTO-ENTMCNC: 32.3 G/DL (ref 31.5–36.5)
MCV RBC AUTO: 81 FL (ref 78–100)
MONOCYTES # BLD AUTO: 0.3 10E3/UL (ref 0–1.3)
MONOCYTES NFR BLD AUTO: 6 %
NEUTROPHILS # BLD AUTO: 3.7 10E3/UL (ref 1.6–8.3)
NEUTROPHILS NFR BLD AUTO: 73 %
NITRATE UR QL: NEGATIVE
NRBC # BLD AUTO: 0 10E3/UL
NRBC BLD AUTO-RTO: 0 /100
NT-PROBNP SERPL-MCNC: <36 PG/ML (ref 0–450)
PH UR STRIP: 7 [PH] (ref 5–9)
PLATELET # BLD AUTO: 249 10E3/UL (ref 150–450)
POTASSIUM SERPL-SCNC: 3.4 MMOL/L (ref 3.4–5.3)
PROT SERPL-MCNC: 7.5 G/DL (ref 6.4–8.3)
RBC # BLD AUTO: 4.43 10E6/UL (ref 3.8–5.2)
RBC URINE: 2 /HPF
RSV RNA SPEC NAA+PROBE: NEGATIVE
SARS-COV-2 RNA RESP QL NAA+PROBE: NEGATIVE
SODIUM SERPL-SCNC: 134 MMOL/L (ref 135–145)
SP GR UR STRIP: 1.01 (ref 1–1.03)
TROPONIN T SERPL HS-MCNC: <6 NG/L
TSH SERPL DL<=0.005 MIU/L-ACNC: 1.32 UIU/ML (ref 0.3–4.2)
UROBILINOGEN UR STRIP-MCNC: NORMAL MG/DL
WBC # BLD AUTO: 5 10E3/UL (ref 4–11)
WBC URINE: 1 /HPF

## 2024-07-20 PROCEDURE — 258N000003 HC RX IP 258 OP 636: Performed by: PHYSICIAN ASSISTANT

## 2024-07-20 PROCEDURE — 82077 ASSAY SPEC XCP UR&BREATH IA: CPT | Performed by: PHYSICIAN ASSISTANT

## 2024-07-20 PROCEDURE — 93010 ELECTROCARDIOGRAM REPORT: CPT | Performed by: INTERNAL MEDICINE

## 2024-07-20 PROCEDURE — 83605 ASSAY OF LACTIC ACID: CPT | Performed by: PHYSICIAN ASSISTANT

## 2024-07-20 PROCEDURE — 93005 ELECTROCARDIOGRAM TRACING: CPT | Performed by: PHYSICIAN ASSISTANT

## 2024-07-20 PROCEDURE — 71045 X-RAY EXAM CHEST 1 VIEW: CPT

## 2024-07-20 PROCEDURE — 83880 ASSAY OF NATRIURETIC PEPTIDE: CPT | Performed by: PHYSICIAN ASSISTANT

## 2024-07-20 PROCEDURE — 87086 URINE CULTURE/COLONY COUNT: CPT | Performed by: PHYSICIAN ASSISTANT

## 2024-07-20 PROCEDURE — 87637 SARSCOV2&INF A&B&RSV AMP PRB: CPT | Performed by: PHYSICIAN ASSISTANT

## 2024-07-20 PROCEDURE — 99285 EMERGENCY DEPT VISIT HI MDM: CPT | Mod: 25 | Performed by: PHYSICIAN ASSISTANT

## 2024-07-20 PROCEDURE — 84484 ASSAY OF TROPONIN QUANT: CPT | Performed by: PHYSICIAN ASSISTANT

## 2024-07-20 PROCEDURE — 83036 HEMOGLOBIN GLYCOSYLATED A1C: CPT | Performed by: PHYSICIAN ASSISTANT

## 2024-07-20 PROCEDURE — 250N000011 HC RX IP 250 OP 636: Performed by: PHYSICIAN ASSISTANT

## 2024-07-20 PROCEDURE — 36415 COLL VENOUS BLD VENIPUNCTURE: CPT | Performed by: PHYSICIAN ASSISTANT

## 2024-07-20 PROCEDURE — 83735 ASSAY OF MAGNESIUM: CPT | Performed by: PHYSICIAN ASSISTANT

## 2024-07-20 PROCEDURE — 85025 COMPLETE CBC W/AUTO DIFF WBC: CPT | Performed by: PHYSICIAN ASSISTANT

## 2024-07-20 PROCEDURE — 82040 ASSAY OF SERUM ALBUMIN: CPT | Performed by: PHYSICIAN ASSISTANT

## 2024-07-20 PROCEDURE — 96375 TX/PRO/DX INJ NEW DRUG ADDON: CPT | Performed by: PHYSICIAN ASSISTANT

## 2024-07-20 PROCEDURE — 99284 EMERGENCY DEPT VISIT MOD MDM: CPT | Performed by: PHYSICIAN ASSISTANT

## 2024-07-20 PROCEDURE — 83605 ASSAY OF LACTIC ACID: CPT | Mod: 91 | Performed by: PHYSICIAN ASSISTANT

## 2024-07-20 PROCEDURE — 81001 URINALYSIS AUTO W/SCOPE: CPT | Performed by: PHYSICIAN ASSISTANT

## 2024-07-20 PROCEDURE — 96374 THER/PROPH/DIAG INJ IV PUSH: CPT | Performed by: PHYSICIAN ASSISTANT

## 2024-07-20 PROCEDURE — 70450 CT HEAD/BRAIN W/O DYE: CPT

## 2024-07-20 PROCEDURE — 84443 ASSAY THYROID STIM HORMONE: CPT | Performed by: PHYSICIAN ASSISTANT

## 2024-07-20 PROCEDURE — 250N000013 HC RX MED GY IP 250 OP 250 PS 637: Performed by: PHYSICIAN ASSISTANT

## 2024-07-20 PROCEDURE — 84702 CHORIONIC GONADOTROPIN TEST: CPT | Performed by: PHYSICIAN ASSISTANT

## 2024-07-20 RX ORDER — ONDANSETRON 2 MG/ML
4 INJECTION INTRAMUSCULAR; INTRAVENOUS ONCE
Status: COMPLETED | OUTPATIENT
Start: 2024-07-20 | End: 2024-07-20

## 2024-07-20 RX ORDER — KETOROLAC TROMETHAMINE 15 MG/ML
15 INJECTION, SOLUTION INTRAMUSCULAR; INTRAVENOUS ONCE
Status: COMPLETED | OUTPATIENT
Start: 2024-07-20 | End: 2024-07-20

## 2024-07-20 RX ORDER — SULFAMETHOXAZOLE/TRIMETHOPRIM 800-160 MG
1 TABLET ORAL 2 TIMES DAILY
Qty: 14 TABLET | Refills: 0 | Status: SHIPPED | OUTPATIENT
Start: 2024-07-20 | End: 2024-09-26

## 2024-07-20 RX ORDER — MECLIZINE HCL 12.5 MG 12.5 MG/1
25 TABLET ORAL ONCE
Status: COMPLETED | OUTPATIENT
Start: 2024-07-20 | End: 2024-07-20

## 2024-07-20 RX ORDER — MECLIZINE HYDROCHLORIDE 25 MG/1
25 TABLET ORAL 3 TIMES DAILY PRN
Qty: 30 TABLET | Refills: 0 | Status: SHIPPED | OUTPATIENT
Start: 2024-07-20 | End: 2024-09-26

## 2024-07-20 RX ORDER — CEFTRIAXONE 1 G/1
1 INJECTION, POWDER, FOR SOLUTION INTRAMUSCULAR; INTRAVENOUS ONCE
Status: COMPLETED | OUTPATIENT
Start: 2024-07-20 | End: 2024-07-20

## 2024-07-20 RX ORDER — ONDANSETRON 4 MG/1
4 TABLET, ORALLY DISINTEGRATING ORAL EVERY 6 HOURS PRN
Qty: 15 TABLET | Refills: 0 | Status: SHIPPED | OUTPATIENT
Start: 2024-07-20 | End: 2024-09-26

## 2024-07-20 RX ADMIN — MECLIZINE 25 MG: 12.5 TABLET ORAL at 13:14

## 2024-07-20 RX ADMIN — SODIUM CHLORIDE 1000 ML: 9 INJECTION, SOLUTION INTRAVENOUS at 10:27

## 2024-07-20 RX ADMIN — ONDANSETRON 4 MG: 2 INJECTION INTRAMUSCULAR; INTRAVENOUS at 10:29

## 2024-07-20 RX ADMIN — KETOROLAC TROMETHAMINE 15 MG: 15 INJECTION, SOLUTION INTRAMUSCULAR; INTRAVENOUS at 10:29

## 2024-07-20 RX ADMIN — SODIUM CHLORIDE 1000 ML: 9 INJECTION, SOLUTION INTRAVENOUS at 11:29

## 2024-07-20 RX ADMIN — CEFTRIAXONE SODIUM 1 G: 1 INJECTION, POWDER, FOR SOLUTION INTRAMUSCULAR; INTRAVENOUS at 12:27

## 2024-07-20 ASSESSMENT — ACTIVITIES OF DAILY LIVING (ADL)
ADLS_ACUITY_SCORE: 35

## 2024-07-20 NOTE — ED TRIAGE NOTES
Pt arrives via private car from [facility] with S/O.  Since Thursday she has been dizzy, fatigued, N/V and cold sweats.  In triage pt did appear to not feel well.     Triage Assessment (Adult)       Row Name 07/20/24 1006          Triage Assessment    Airway WDL WDL        Respiratory WDL    Respiratory WDL WDL        Skin Circulation/Temperature WDL    Skin Circulation/Temperature WDL WDL        Cardiac WDL    Cardiac WDL WDL        Peripheral/Neurovascular WDL    Peripheral Neurovascular WDL WDL        Cognitive/Neuro/Behavioral WDL    Cognitive/Neuro/Behavioral WDL WDL        [place] Coma Scale    Best Eye Response 4-->(E4) spontaneous     Best Motor Response 6-->(M6) obeys commands     Best Verbal Response 5-->(V5) oriented     [place] Coma Scale Score 15

## 2024-07-20 NOTE — DISCHARGE INSTRUCTIONS
-Take Bactrim 1 tablet every 12 hours for 7 days for treatment of UTI.  Take first dose this evening.  -Take Zofran 4 mg every 6 hours as needed for nausea vomiting  -Take meclizine 25 mg every 6 hours for vertigo.  -Head CT negative.  -Nausea and vomiting may take up to 5 to 7 days to completely resolve it is a viral illness.  This being caused by UTI, will take roughly about 48 hours before seeing improvement.  Recommend close follow-up with primary care doctor for follow-up for UTI, vertigo, and prediabetes.  Please return to the ER if you have any worsening symptoms to include fever, worsening vomiting, unable to eat or drink, abdominal pain, bloody stools, throwing up blood, worsening headache, stiff neck, or any other concerning symptom.

## 2024-07-20 NOTE — ED PROVIDER NOTES
EMERGENCY DEPARTMENT ENCOUNTER      NAME: Lynn Odom  AGE: 48 year old female  YOB: 1975  MRN: [id number]  EVALUATION DATE & TIME: 7/20/2024  9:50 AM    PCP: Tameka King    ED PROVIDER: Berhane Heath PA-C       CHIEF COMPLAINT:  Chief Complaint   Patient presents with    Dizziness    Vomiting    Fatigue         HPI  Lynn Odom is a pleasant 48 year old female who presents to the ER via private vehicle with her  for evaluation of nausea and vomiting.  Patient's been having nausea and vomiting for the past 2 to 3 days.  Met emesis.  Patient feeling lightheaded.  States her oral intake is still fairly well.  Still having fluids.  Intermittent diarrhea but no diarrhea today.  No chest pain, shortness of breath, dyspnea, headache, or abdominal pain.  No urinary or vaginal symptoms.  No bloody or melanotic stools.  Slight cough.  No runny nose, congestion, ear pain, or sore throat.  Denies any sick contacts.  No recent travel.  No recent antibiotic use.      REVIEW OF SYSTEMS   Review of Systems  As above, otherwise ROS is unremarkable.      PAST MEDICAL HISTORY:  No past medical history on file.      PAST SURGICAL HISTORY:  Past Surgical History:   Procedure Laterality Date    COLONOSCOPY N/A 01/25/2024    normal 10 yr follow up    DILATION AND CURETTAGE      2014 in 2016 for abortions.    Partial amputation      First digit right hand, posttraumatic at age 7 years old         CURRENT MEDICATIONS:    Current Outpatient Medications   Medication Instructions    levothyroxine (SYNTHROID/LEVOTHROID) 75 mcg, Oral, DAILY, Patient takes medication 5 days a week         ALLERGIES:  No Known Allergies      FAMILY HISTORY:  Family History   Family history unknown: Yes         SOCIAL HISTORY:   Social History     Socioeconomic History    Marital status:    Tobacco Use    Smoking status: Never    Smokeless tobacco: Never   Substance and Sexual Activity    Alcohol  "use: Yes     Comment: Occ    Drug use: Never    Sexual activity: Yes     Partners: Male   Social History Narrative    Moved from [facility] to the [place].  Has an adult daughter born in 1996 who still lives in the [facility].   Rd Odom, met on Facebook.        ==================================================================================================================================    PHYSICAL EXAM    VITAL SIGNS: BP (!) 144/88   Pulse 80   Temp 97.5  F (36.4  C) (Tympanic)   Resp 17   Ht 1.6 m (5' 3\")   Wt 65.8 kg (145 lb)   LMP 07/06/2024   SpO2 100%   BMI 25.69 kg/m      Patient Vitals for the past 24 hrs:   BP Temp Temp src Pulse Resp SpO2 Height Weight   07/20/24 1211 (!) 144/88 -- -- 80 -- 100 % -- --   07/20/24 1210 (!) 161/93 -- -- 82 -- 100 % -- --   07/20/24 1209 (!) 142/85 -- -- 69 -- -- -- --   07/20/24 0946 132/75 97.5  F (36.4  C) Tympanic 70 17 100 % 1.6 m (5' 3\") 65.8 kg (145 lb)       Physical Exam  Vitals and nursing note reviewed.   Constitutional:       General: She is not in acute distress.     Appearance: Normal appearance. She is ill-appearing. She is not toxic-appearing or diaphoretic.   HENT:      Head: Normocephalic and atraumatic.      Right Ear: Tympanic membrane, ear canal and external ear normal.      Left Ear: Tympanic membrane, ear canal and external ear normal.      Ears:      Comments: No mastoid tenderness bilaterally     Mouth/Throat:      Mouth: Mucous membranes are moist.      Pharynx: Oropharynx is clear.   Eyes:      Extraocular Movements: Extraocular movements intact.      Conjunctiva/sclera: Conjunctivae normal.      Pupils: Pupils are equal, round, and reactive to light.   Cardiovascular:      Rate and Rhythm: Normal rate and regular rhythm.      Pulses: Normal pulses.      Heart sounds: Normal heart sounds.   Pulmonary:      Effort: Pulmonary effort is normal.      Breath sounds: Normal breath sounds. No wheezing, rhonchi or rales. "   Abdominal:      General: Abdomen is flat. Bowel sounds are normal.      Palpations: Abdomen is soft.      Tenderness: There is no abdominal tenderness. There is no right CVA tenderness or left CVA tenderness.   Musculoskeletal:         General: Normal range of motion.      Cervical back: Normal range of motion and neck supple. No rigidity or tenderness.   Skin:     General: Skin is warm and dry.      Capillary Refill: Capillary refill takes less than 2 seconds.      Coloration: Skin is not jaundiced.   Neurological:      General: No focal deficit present.      Mental Status: She is alert and oriented to person, place, and time.      Comments: GCS 15.  Alert and orientated x 4.  Normal speech.  CN II-XII exam normal.  Normal visual fields.  Normal finger-to-finger and finger-to-nose exam.  Muscle strength grossly normal and symmetrical in the upper/lower extremities.  Sensation to light touch grossly intact and symmetrical in the upper/lower extremities.  Patient with difficulty walking due to abnormal balance   Psychiatric:         Mood and Affect: Mood normal.            ==================================================================================================================================    LABS & RADIOLOGY:  All pertinent labs reviewed and interpreted. Reviewed all pertinent imaging. Please see official radiology report.  Results for orders placed or performed during the hospital encounter of 07/20/24   XR Chest Port 1 View    Impression    IMPRESSION:   No evidence of pneumonia or interstitial edema.     THIS DOCUMENT HAS BEEN ELECTRONICALLY SIGNED BY MILDRED COLLINS MD   CT Head w/o Contrast    Impression    IMPRESSION: Negative Head CT    ACOSTA GRAY MD         SYSTEM ID:  [id number]   Comprehensive metabolic panel   Result Value Ref Range    Sodium 134 (L) 135 - 145 mmol/L    Potassium 3.4 3.4 - 5.3 mmol/L    Carbon Dioxide (CO2) 23 22 - 29 mmol/L    Anion Gap 12 7 - 15 mmol/L     Urea Nitrogen 7.7 6.0 - 20.0 mg/dL    Creatinine 0.59 0.51 - 0.95 mg/dL    GFR Estimate >90 >60 mL/min/1.73m2    Calcium 8.7 (L) 8.8 - 10.4 mg/dL    Chloride 99 98 - 107 mmol/L    Glucose 157 (H) 70 - 99 mg/dL    Alkaline Phosphatase 72 40 - 150 U/L    AST 14 0 - 45 U/L    ALT 16 0 - 50 U/L    Protein Total 7.5 6.4 - 8.3 g/dL    Albumin 4.3 3.5 - 5.2 g/dL    Bilirubin Total 0.4 <=1.2 mg/dL   Lactic acid whole blood with 1x repeat in 2 hr when >2   Result Value Ref Range    Lactic Acid, Initial 2.6 (H) 0.7 - 2.0 mmol/L   Result Value Ref Range    Magnesium 2.0 1.7 - 2.3 mg/dL   TSH Reflex GH   Result Value Ref Range    TSH 1.32 0.30 - 4.20 uIU/mL   UA with Microscopic reflex to Culture    Specimen: Urine, Midstream   Result Value Ref Range    Color Urine Yellow Colorless, Straw, Light Yellow, Yellow    Appearance Urine Clear Clear    Glucose Urine 30 (A) Negative mg/dL    Bilirubin Urine Negative Negative    Ketones Urine Negative Negative mg/dL    Specific Gravity Urine 1.007 1.000 - 1.030    Blood Urine Trace (A) Negative    pH Urine 7.0 5.0 - 9.0    Protein Albumin Urine Negative Negative mg/dL    Urobilinogen Urine Normal Normal, 2.0 mg/dL    Nitrite Urine Negative Negative    Leukocyte Esterase Urine Moderate (A) Negative    Bacteria Urine Few (A) None Seen /HPF    RBC Urine 2 <=2 /HPF    WBC Urine 1 <=5 /HPF   Result Value Ref Range    Troponin T, High Sensitivity <6 <=14 ng/L   Nt probnp inpatient (BNP)   Result Value Ref Range    N terminal Pro BNP Inpatient <36 0 - 450 pg/mL   HCG quantitative pregnancy (blood)   Result Value Ref Range    hCG Quantitative <1 <5 mIU/mL   Asymptomatic Influenza A/B, RSV, & SARS-CoV2 PCR (COVID-19) Nose    Specimen: Nose; Swab   Result Value Ref Range    Influenza A PCR Negative Negative    Influenza B PCR Negative Negative    RSV PCR Negative Negative    SARS CoV2 PCR Negative Negative   CBC with platelets and differential   Result Value Ref Range    WBC Count 5.0 4.0 - 11.0  10e3/uL    RBC Count 4.43 3.80 - 5.20 10e6/uL    Hemoglobin 11.6 (L) 11.7 - 15.7 g/dL    Hematocrit 35.9 35.0 - 47.0 %    MCV 81 78 - 100 fL    MCH 26.2 (L) 26.5 - 33.0 pg    MCHC 32.3 31.5 - 36.5 g/dL    RDW 13.7 10.0 - 15.0 %    Platelet Count 249 150 - 450 10e3/uL    % Neutrophils 73 %    % Lymphocytes 20 %    % Monocytes 6 %    % Eosinophils 0 %    % Basophils 1 %    % Immature Granulocytes 0 %    NRBCs per 100 WBC 0 <1 /100    Absolute Neutrophils 3.7 1.6 - 8.3 10e3/uL    Absolute Lymphocytes 1.0 0.8 - 5.3 10e3/uL    Absolute Monocytes 0.3 0.0 - 1.3 10e3/uL    Absolute Eosinophils 0.0 0.0 - 0.7 10e3/uL    Absolute Basophils 0.0 0.0 - 0.2 10e3/uL    Absolute Immature Granulocytes 0.0 <=0.4 10e3/uL    Absolute NRBCs 0.0 10e3/uL   Lactic acid whole blood   Result Value Ref Range    Lactic Acid 2.3 (H) 0.7 - 2.0 mmol/L   Ethanol GH   Result Value Ref Range    Alcohol ethyl <0.01 <=0.01 g/dL   Result Value Ref Range    Hemoglobin A1C 6.3 (H) 4.0 - 6.2 %     CT Head w/o Contrast   Final Result   IMPRESSION: Negative Head CT      ACOSTA GRAY MD            SYSTEM ID:  [id number]      XR Chest Port 1 View   Final Result   IMPRESSION:    No evidence of pneumonia or interstitial edema.       THIS DOCUMENT HAS BEEN ELECTRONICALLY SIGNED BY MILDRED COLLINS MD            EKG:    EKG reviewed at 1005.  1) Rhythm: NSR  2) Rate: ventricular rate 61 bpm.  3) QRS Axis: No axis deviation  4) P waves/ ME interval: Sinus. Nml SAIDA. No atrial enlargement  5) QRS complex: Narrow. No BBB. No ventricular hypertrophy. No pathological Q waves.  6) ST Segment: No acute ST segment elevation or depression  7) T waves: No T wave inversions. No peaked or flattened T waves.     I have independently reviewed and interpreted today's EKG, pending cardiologist over read.       ==================================================================================================================================    ED COURSE, MEDICAL DECISION  "MAKING, FINAL IMPRESSION AND PLAN:     Assessment / Plan:  1. Nausea and vomiting    2. Elevated lactic acid level    3. UTI (urinary tract infection)    4. Prediabetes    5. Vertigo        The patient was interviewed and examined.  HPI and physical exam as below.  Differential diagnosis and MDM Key Documentation Elements as below.  Vitals, triage note, and nursing notes were reviewed.  BP (!) 144/88   Pulse 80   Temp 97.5  F (36.4  C) (Tympanic)   Resp 17   Ht 1.6 m (5' 3\")   Wt 65.8 kg (145 lb)   LMP 07/06/2024   SpO2 100%   BMI 25.69 kg/m      Differential includes but is not limited to gastroenteritis, viral infection, electrolyte abnormalities, dehydration, small bowel obstruction, cholecystitis, pancreatitis, COVID    Patient was afebrile with otherwise normal vitals.  Patient was in no acute distress.  Patient was slightly ill-appearing.  Not diaphoretic.  Heart lungs were clear.  No meningeal signs.  No abdominal, flank, or CVA tenderness.    Patient with no leukocytosis.  Hemoglobin slightly low 11.6.  Glucose 157.  Sodium 134.  Normal renal and hepatic functions.  Lactic acid elevated 2.6.  Patient was not pregnant.  EKG was normal-appearing with no ST segment deviation suggest ACS/MI.  Screening BNP and troponin were negative.  Chest x-ray was negative.  UA showed signs of UTI with trace amount of hematuria.  Patient did have glucosuria but denied any history of diabetes.  COVID, flu, and RSV were all negative.  TSH normal.  After seeing elevation of blood sugar and glucosuria, A1c was ordered with patient being a prediabetic range at 6.3.    Patient given 2 L of normal saline and IV Rocephin for treatment of lightheadedness and UTI.  Patient was ambulated here in the ER with patient still feeling slightly lightheaded but also patient having vertigo.  Vertigo seems to be better with lying down and worse with standing up.  Out of abundance of caution, I did perform a head CT which was negative for " signs of acute stroke, mass tumor, or any cranial hemorrhage.  Patient was given meclizine with slight improvement in symptoms.    Plan is for patient to be sent home with Bactrim for UTI twice daily for 7 days.  Patient will be given Zofran and meclizine for nausea and vertigo.  Referral to primary care was given for further evaluation for UTI, vertigo, and prediabetes.  Patient discharged home in stable and slightly improved condition.  Copy of results given.    Pertinent Labs & Imaging studies reviewed. (See chart for details)  Results for orders placed or performed during the hospital encounter of 07/20/24   XR Chest Port 1 View    Impression    IMPRESSION:   No evidence of pneumonia or interstitial edema.     THIS DOCUMENT HAS BEEN ELECTRONICALLY SIGNED BY MILDRED COLLINS MD   CT Head w/o Contrast    Impression    IMPRESSION: Negative Head CT    ACOSTA GRAY MD         SYSTEM ID:  [id number]   Comprehensive metabolic panel   Result Value Ref Range    Sodium 134 (L) 135 - 145 mmol/L    Potassium 3.4 3.4 - 5.3 mmol/L    Carbon Dioxide (CO2) 23 22 - 29 mmol/L    Anion Gap 12 7 - 15 mmol/L    Urea Nitrogen 7.7 6.0 - 20.0 mg/dL    Creatinine 0.59 0.51 - 0.95 mg/dL    GFR Estimate >90 >60 mL/min/1.73m2    Calcium 8.7 (L) 8.8 - 10.4 mg/dL    Chloride 99 98 - 107 mmol/L    Glucose 157 (H) 70 - 99 mg/dL    Alkaline Phosphatase 72 40 - 150 U/L    AST 14 0 - 45 U/L    ALT 16 0 - 50 U/L    Protein Total 7.5 6.4 - 8.3 g/dL    Albumin 4.3 3.5 - 5.2 g/dL    Bilirubin Total 0.4 <=1.2 mg/dL   Lactic acid whole blood with 1x repeat in 2 hr when >2   Result Value Ref Range    Lactic Acid, Initial 2.6 (H) 0.7 - 2.0 mmol/L   Result Value Ref Range    Magnesium 2.0 1.7 - 2.3 mg/dL   TSH Reflex GH   Result Value Ref Range    TSH 1.32 0.30 - 4.20 uIU/mL   UA with Microscopic reflex to Culture    Specimen: Urine, Midstream   Result Value Ref Range    Color Urine Yellow Colorless, Straw, Light Yellow, Yellow    Appearance  Urine Clear Clear    Glucose Urine 30 (A) Negative mg/dL    Bilirubin Urine Negative Negative    Ketones Urine Negative Negative mg/dL    Specific Gravity Urine 1.007 1.000 - 1.030    Blood Urine Trace (A) Negative    pH Urine 7.0 5.0 - 9.0    Protein Albumin Urine Negative Negative mg/dL    Urobilinogen Urine Normal Normal, 2.0 mg/dL    Nitrite Urine Negative Negative    Leukocyte Esterase Urine Moderate (A) Negative    Bacteria Urine Few (A) None Seen /HPF    RBC Urine 2 <=2 /HPF    WBC Urine 1 <=5 /HPF   Result Value Ref Range    Troponin T, High Sensitivity <6 <=14 ng/L   Nt probnp inpatient (BNP)   Result Value Ref Range    N terminal Pro BNP Inpatient <36 0 - 450 pg/mL   HCG quantitative pregnancy (blood)   Result Value Ref Range    hCG Quantitative <1 <5 mIU/mL   Asymptomatic Influenza A/B, RSV, & SARS-CoV2 PCR (COVID-19) Nose    Specimen: Nose; Swab   Result Value Ref Range    Influenza A PCR Negative Negative    Influenza B PCR Negative Negative    RSV PCR Negative Negative    SARS CoV2 PCR Negative Negative   CBC with platelets and differential   Result Value Ref Range    WBC Count 5.0 4.0 - 11.0 10e3/uL    RBC Count 4.43 3.80 - 5.20 10e6/uL    Hemoglobin 11.6 (L) 11.7 - 15.7 g/dL    Hematocrit 35.9 35.0 - 47.0 %    MCV 81 78 - 100 fL    MCH 26.2 (L) 26.5 - 33.0 pg    MCHC 32.3 31.5 - 36.5 g/dL    RDW 13.7 10.0 - 15.0 %    Platelet Count 249 150 - 450 10e3/uL    % Neutrophils 73 %    % Lymphocytes 20 %    % Monocytes 6 %    % Eosinophils 0 %    % Basophils 1 %    % Immature Granulocytes 0 %    NRBCs per 100 WBC 0 <1 /100    Absolute Neutrophils 3.7 1.6 - 8.3 10e3/uL    Absolute Lymphocytes 1.0 0.8 - 5.3 10e3/uL    Absolute Monocytes 0.3 0.0 - 1.3 10e3/uL    Absolute Eosinophils 0.0 0.0 - 0.7 10e3/uL    Absolute Basophils 0.0 0.0 - 0.2 10e3/uL    Absolute Immature Granulocytes 0.0 <=0.4 10e3/uL    Absolute NRBCs 0.0 10e3/uL   Lactic acid whole blood   Result Value Ref Range    Lactic Acid 2.3 (H) 0.7 - 2.0  "mmol/L   Ethanol GH   Result Value Ref Range    Alcohol ethyl <0.01 <=0.01 g/dL   Result Value Ref Range    Hemoglobin A1C 6.3 (H) 4.0 - 6.2 %     No results found for: \"ABORH\"      Reassessments, Medications, Interventions, & Response to Treatments:  -as above    Medications given during today's ER visit:  Medications   sodium chloride 0.9% BOLUS 1,000 mL (0 mLs Intravenous Stopped 7/20/24 1129)   ketorolac (TORADOL) injection 15 mg (15 mg Intravenous $Given 7/20/24 1029)   ondansetron (ZOFRAN) injection 4 mg (4 mg Intravenous $Given 7/20/24 1029)   sodium chloride 0.9% BOLUS 1,000 mL (0 mLs Intravenous Stopped 7/20/24 1408)   cefTRIAXone (ROCEPHIN) 1 g vial to attach to  mL bag for ADULTS or NS 50 mL bag for PEDS (0 g Intravenous Stopped 7/20/24 1408)   meclizine (ANTIVERT) tablet 25 mg (25 mg Oral $Given 7/20/24 1314)       Consultations:  None    Decision Rules, Medical Calculators, and Risk Stratification Tools:  None    MDM Key Documentation Elements for Patient's Evaluation:  Differential diagnosis to include high risk considerations: As above  Escalation to admission/observation considered: Admission/observation considered, but patient does not meet admission criteria  Discussions and management with other clinicians:    3a. Independent interpretation of testing performed by another health professional:  -No  3b. Discussion of management or test interpretation with another health professional: -No  Independent interpretation of tests:  Ordering and/or review of 3+ test(s)  Discussion of test interpretations with radiology:  No  History obtained from source other than patient or assessment requiring an independent historian:  No  Review of non-ED/external records:  review of 3+ records  Diagnostic tests considered but not ultimately performed/deferred:  -MR brain  Prescription medications considered but not prescribed:  -Keflex, metformin  Chronic conditions affecting care:  -None  Care affected by " social determinants of health:  -None    The patient's management involved:   - Laboratory studies  - Imaging studies  - Parenteral controlled substance  - Prescription drug management      A shared decision making model was used. Time was taken to answer all questions.  Patient and/or associated parties understood and were agreeable to treatment plan.  Plan and all results were discussed. Warning signs and close return precautions to return to the ED given. Copy of results given. Discharged in stable condition. Discharged with discharge instructions outlining plan for further care and follow up.      New prescriptions started at today's ER visit  New Prescriptions    MECLIZINE (ANTIVERT) 25 MG TABLET    Take 1 tablet (25 mg) by mouth 3 times daily as needed for dizziness    ONDANSETRON (ZOFRAN ODT) 4 MG ODT TAB    Take 1 tablet (4 mg) by mouth every 6 hours as needed for nausea    SULFAMETHOXAZOLE-TRIMETHOPRIM (BACTRIM DS) 800-160 MG TABLET    Take 1 tablet by mouth 2 times daily       ==================================================================================================================================      Damien PHAN PA-C, personally performed the services described in this documentation, and it is both accurate and complete.       Berhane Heath PA-C  07/20/24 1070

## 2024-07-21 ENCOUNTER — TELEPHONE (OUTPATIENT)
Dept: NURSING | Facility: CLINIC | Age: 49
End: 2024-07-21
Payer: COMMERCIAL

## 2024-07-21 LAB — BACTERIA UR CULT: NORMAL

## 2024-07-21 NOTE — LETTER
July 21, 2024        Lynn Odom  [address] [address]          Dear Lynn Odom:    You were seen in the [facility] Emergency Department at [facility] on 7/20/2024.  We are unable to reach you by phone, so we are sending you this letter.     It is important that you call [facility] Emergency Department lab result nurse at [phone], as we have information to relay to you AND/OR we MAY have to make some changes in your treatment.    Best time to call back is between 9AM and 5:30PM, 7 days a week.      Sincerely,     [facility] Emergency Department Lab Result RN  [phone]

## 2024-07-21 NOTE — TELEPHONE ENCOUNTER
Patient and her  calling back. Patient denies any urinary symptoms. Patient advised per protocol to discontinue the antibiotic. She will reach out to her PCP tomorrow.     EULALIA ARAIZA RN

## 2024-07-21 NOTE — TELEPHONE ENCOUNTER
Final urine culture report is negative.     Unable to reach pt to assess symptoms or advise on discontinuing antibiotic. Left message. MyChart message sent.    Adult: Negative urine culture parameters per protocol: Any # urogenital kedar, single or mixed     [facility] Emergency Dept discharge antibiotic prescribed (If applicable): sulfamethoxazole-trimethoprim (BACTRIM DS) 800-160 MG tablet -Take 1 tablet by mouth 2 times daily     Treatment recommendations per [facility] ED Lab Result Urine Culture protocol: Discontinue antibiotic.      Laurie Zhou RN

## 2024-07-22 LAB
ATRIAL RATE - MUSE: 61 BPM
DIASTOLIC BLOOD PRESSURE - MUSE: NORMAL MMHG
INTERPRETATION ECG - MUSE: NORMAL
P AXIS - MUSE: 60 DEGREES
PR INTERVAL - MUSE: 160 MS
QRS DURATION - MUSE: 86 MS
QT - MUSE: 424 MS
QTC - MUSE: 426 MS
R AXIS - MUSE: 47 DEGREES
SYSTOLIC BLOOD PRESSURE - MUSE: NORMAL MMHG
T AXIS - MUSE: 37 DEGREES
VENTRICULAR RATE- MUSE: 61 BPM

## 2024-09-16 DIAGNOSIS — E05.00 GRAVES DISEASE: Primary | ICD-10-CM

## 2024-09-16 NOTE — TELEPHONE ENCOUNTER
Reason for call: Medication or medication refill    Have you contacted your pharmacy regarding this refill? NA    If No, direct the patient to contact the Pharmacy and discontinue telephone note using Erroneous Encounter.  If Yes, continue.    Name of medication requested: Levothyroxine (previously filled by a physician in [place])    How many days of medication do you have left? Two weeks    What pharmacy do you use? [facility]    Preferred method for responding to this message: Telephone Call    Phone number patient can be reached at: Cell number on file:    Telephone Information:   Mobile [phone]       If we cannot reach you directly, may we leave a detailed response at the number you provided? Yes    Carole Zuniga on 9/16/2024 at 4:39 PM

## 2024-09-17 RX ORDER — LEVOTHYROXINE SODIUM 75 UG/1
75 TABLET ORAL DAILY
Qty: 30 TABLET | Refills: 0 | Status: SHIPPED | OUTPATIENT
Start: 2024-09-17 | End: 2024-09-26

## 2024-09-17 NOTE — TELEPHONE ENCOUNTER
[facility] sent Rx request for the following:      Requested Prescriptions   Pending Prescriptions Disp Refills    levothyroxine (SYNTHROID/LEVOTHROID) 75 MCG tablet       Sig: Take 1 tablet (75 mcg) by mouth daily. Patient takes medication 5 days a week       Thyroid Protocol Failed - 9/17/2024  8:24 AM        Failed - Recent (12 mo) or future (30 days) visit within the authorizing provider's specialty     The patient must have completed an in-person or virtual visit within the past 12 months or has a future visit scheduled within the next 90 days with the authorizing provider s specialty.  Urgent care and e-visits do not quality as an office visit for this protocol.          Failed - Medication indicated for associated diagnosis     Medication is associated with one or more of the following diagnoses:  Hypothyroidism  Thyroid stimulating hormone suppression therapy  Thyroid cancer  Acquired atrophy of thyroid       Last Prescription Date:   10/25/22  Last Fill Qty/Refills:         Patient reported medication  Last Office Visit:              07/20/24 (Damien Heath MD - [facility])   TSH with free T4 was 1.32  Future Office visit:             Next 5 appointments (look out 90 days)      Sep 26, 2024 12:40 PM  (Arrive by 12:25 PM)  Provider Visit with MARCUS Whittaker [facility] ([facility] ) [address]  [place] [address] [phone]           This is a patient reported medication.  Unable to complete prescription refill per RN Medication Refill Policy.   Will route to her PCP for refill consideration.  Tequila Pierre RN on 9/17/2024 at 8:57 AM

## 2024-09-17 NOTE — TELEPHONE ENCOUNTER
This med goes through her endocrinology. Refills should be routed to them. MARCUS Bryan CNP on 9/17/2024 at 1:06 PM

## 2024-09-17 NOTE — TELEPHONE ENCOUNTER
Called and spoke with patient and patient , Rd.  They state that they did reach out to Endocrinologist in [place] and they were told to follow up with PCP as patient is no longer following with endocrinologist.    Ana Delgado RN on 9/17/2024 at 1:40 PM

## 2024-09-17 NOTE — TELEPHONE ENCOUNTER
Order signed, will discuss further at upcoming appointment. Per endocrinology appointment 5/19/2023 was to follow up with endocrinology 6 weeks later.  MARCUS Bryan CNP on 9/17/2024 at 2:37 PM

## 2024-09-26 ENCOUNTER — OFFICE VISIT (OUTPATIENT)
Dept: FAMILY MEDICINE | Facility: OTHER | Age: 49
End: 2024-09-26
Attending: NURSE PRACTITIONER
Payer: COMMERCIAL

## 2024-09-26 VITALS
HEIGHT: 63 IN | OXYGEN SATURATION: 99 % | WEIGHT: 150.8 LBS | TEMPERATURE: 98.4 F | RESPIRATION RATE: 16 BRPM | BODY MASS INDEX: 26.72 KG/M2 | DIASTOLIC BLOOD PRESSURE: 84 MMHG | SYSTOLIC BLOOD PRESSURE: 118 MMHG | HEART RATE: 60 BPM

## 2024-09-26 DIAGNOSIS — R11.0 NAUSEA: ICD-10-CM

## 2024-09-26 DIAGNOSIS — E05.00 GRAVES DISEASE: ICD-10-CM

## 2024-09-26 DIAGNOSIS — E03.9 ACQUIRED HYPOTHYROIDISM: ICD-10-CM

## 2024-09-26 DIAGNOSIS — R42 VERTIGO: Primary | ICD-10-CM

## 2024-09-26 DIAGNOSIS — R73.09 ELEVATED GLUCOSE: ICD-10-CM

## 2024-09-26 RX ORDER — LEVOTHYROXINE SODIUM 75 UG/1
75 TABLET ORAL DAILY
Qty: 90 TABLET | Refills: 4 | Status: SHIPPED | OUTPATIENT
Start: 2024-09-26

## 2024-09-26 RX ORDER — ONDANSETRON 4 MG/1
4 TABLET, ORALLY DISINTEGRATING ORAL EVERY 6 HOURS PRN
Qty: 15 TABLET | Refills: 0 | Status: SHIPPED | OUTPATIENT
Start: 2024-09-26

## 2024-09-26 RX ORDER — MECLIZINE HYDROCHLORIDE 25 MG/1
25 TABLET ORAL 3 TIMES DAILY PRN
Qty: 30 TABLET | Refills: 0 | Status: SHIPPED | OUTPATIENT
Start: 2024-09-26

## 2024-09-26 ASSESSMENT — PAIN SCALES - GENERAL: PAINLEVEL: NO PAIN (0)

## 2024-09-26 NOTE — PROGRESS NOTES
Assessment & Plan   Problem List Items Addressed This Visit          Endocrine    Graves disease    Relevant Medications    levothyroxine (SYNTHROID/LEVOTHROID) 75 MCG tablet    Acquired hypothyroidism    Relevant Medications    levothyroxine (SYNTHROID/LEVOTHROID) 75 MCG tablet     Other Visit Diagnoses       Vertigo    -  Primary    Relevant Medications    ondansetron (ZOFRAN ODT) 4 MG ODT tab    meclizine (ANTIVERT) 25 MG tablet    Nausea        Relevant Medications    ondansetron (ZOFRAN ODT) 4 MG ODT tab    Elevated glucose               Graves' disease, TSH 2 months ago was stable, tolerate medications well.  Refilled levothyroxine.  Vertigo and nausea, uses as needed Zofran and meclizine, orders placed.  Elevated glucose, would like to see her back in about 3 months after working on lifestyle modifications including increasing exercise, low-carb diet, handouts were given today on the diabetic diet.  Will recheck labs and complete physical at follow-up visit.    The longitudinal plan of care for the diagnosis(es)/condition(s) as documented were addressed during this visit. Due to the added complexity in care, I will continue to support Lynn in the subsequent management and with ongoing continuity of care.     No follow-ups on file.      Kayden Bailey is a 49 year old, presenting for the following health issues:  Recheck Medication        9/26/2024    12:39 PM   Additional Questions   Roomed by Yovana BUCHANAN CMA   Accompanied by      History of Present Illness       Hypothyroidism:     Since last visit, patient describes the following symptoms::  Weight gain    Weight gain::  6-10 lbs.    She eats 2-3 servings of fruits and vegetables daily.She consumes 1 sweetened beverage(s) daily.She exercises with enough effort to increase her heart rate 30 to 60 minutes per day.  She exercises with enough effort to increase her heart rate 6 days per week.   She is taking medications regularly.     She  "presents to clinic today with her  for medication management.  She previously working with endocrinology for Graves' disease.  She states things have been stable and she was told that she could follow-up with primary care provider.  Review of endocrinology notes, it does indicate that they wanted to see her back in a few months after her last visit.  Regardless, she continues to take levothyroxine, would like to have this refilled today.  Last TSH was completed in July and stable.  She uses as needed meclizine and Zofran for dizziness and nausea.  She reports she has last used either of these in August but would like to have these filled.  When she was in the ER this summer, she did have an elevated blood sugar, hemoglobin A1c was obtained at 6.3.  She does report maternal grandfather with diabetes.        Objective    /84   Pulse 60   Temp 98.4  F (36.9  C) (Tympanic)   Resp 16   Ht 1.594 m (5' 2.75\")   Wt 68.4 kg (150 lb 12.8 oz)   LMP 09/04/2024   SpO2 99%   BMI 26.93 kg/m    Body mass index is 26.93 kg/m .  Physical Exam   GENERAL: alert and no distress  EYES: Eyes grossly normal to inspection  RESP: lungs clear to auscultation - no rales, rhonchi or wheezes  CV: regular rate and rhythm, normal S1 S2  NEURO: Normal strength and tone, mentation intact and speech normal  PSYCH: mentation appears normal, affect normal/bright            Signed Electronically by: MARCUS Bryan CNP    "

## 2024-09-26 NOTE — NURSING NOTE
Pt here with her  for a medication check.  Yovana Dominguez CMA (AAMA)......................9/26/2024  12:42 PM       Medication Reconciliation: complete    Yovana oDminguez CMA  9/26/2024 12:42 PM      FOOD SECURITY SCREENING QUESTIONS:    The next two questions are to help us understand your food security.  If you are feeling you need any assistance in this area, we have resources available to support you today.    Hunger Vital Signs:  Within the past 12 months we worried whether our food would run out before we got money to buy more. Never  Within the past 12 months the food we bought just didn't last and we didn't have money to get more. Never  Yovana Dominguez CMA,LPN on 9/26/2024 at 12:42 PM

## 2024-09-27 PROBLEM — E03.9 ACQUIRED HYPOTHYROIDISM: Status: ACTIVE | Noted: 2023-01-05

## 2024-12-16 ENCOUNTER — DOCUMENTATION ONLY (OUTPATIENT)
Dept: FAMILY MEDICINE | Facility: OTHER | Age: 49
End: 2024-12-16
Payer: COMMERCIAL

## 2024-12-16 DIAGNOSIS — R73.09 ELEVATED GLUCOSE: ICD-10-CM

## 2024-12-16 DIAGNOSIS — E05.00 GRAVES DISEASE: Primary | ICD-10-CM

## 2024-12-16 NOTE — PROGRESS NOTES
Lynn Odom has an upcoming lab appointment and there are no orders available. Please review and place future orders, as appropriate.    Queenie Arguelles     SAB x 1, no D&C    Denies STI, PID, abnl PAP, fibroids, cysts SAB x 1, no D&C    GYNHx: Pt states she had fibroid dx this pregnancy, denies STI, cysts

## 2024-12-17 ENCOUNTER — TELEPHONE (OUTPATIENT)
Dept: FAMILY MEDICINE | Facility: OTHER | Age: 49
End: 2024-12-17
Payer: COMMERCIAL

## 2024-12-17 NOTE — PROGRESS NOTES
Left message for patient to call back. Is there a specific lab she is wanting ordered or is she wanting to have her thyroid check and yearly labs?    Comfort Segal LPN on 12/17/2024 at 4:42 PM

## 2024-12-18 NOTE — PROGRESS NOTES
Per patient, she is coming in for diabetic labs on 12/20/24. Annual visit is scheduled for 12/31/24. Would you like all labs collected?    Deanna Abraham on 12/18/2024 at 11:01 AM

## 2024-12-18 NOTE — TELEPHONE ENCOUNTER
This is handled in documentation encounter on 12/16/24.    Deanna Abraham on 12/18/2024 at 10:32 AM

## 2024-12-18 NOTE — TELEPHONE ENCOUNTER
Spouse [facility]  [phone]      Trish Yan on 12/18/2024 at 10:27 AM     Alert and oriented to person, place, time/situation. normal mood and affect. no apparent risk to self or others.

## 2024-12-23 ENCOUNTER — LAB (OUTPATIENT)
Dept: LAB | Facility: OTHER | Age: 49
End: 2024-12-23
Payer: COMMERCIAL

## 2024-12-23 DIAGNOSIS — R73.09 ELEVATED GLUCOSE: ICD-10-CM

## 2024-12-23 DIAGNOSIS — E05.00 GRAVES DISEASE: ICD-10-CM

## 2024-12-23 LAB
ALBUMIN SERPL BCG-MCNC: 4.3 G/DL (ref 3.5–5.2)
ALP SERPL-CCNC: 72 U/L (ref 40–150)
ALT SERPL W P-5'-P-CCNC: 17 U/L (ref 0–50)
ANION GAP SERPL CALCULATED.3IONS-SCNC: 8 MMOL/L (ref 7–15)
AST SERPL W P-5'-P-CCNC: 19 U/L (ref 0–45)
BILIRUB SERPL-MCNC: 0.6 MG/DL
BUN SERPL-MCNC: 7.9 MG/DL (ref 6–20)
CALCIUM SERPL-MCNC: 9.1 MG/DL (ref 8.8–10.4)
CHLORIDE SERPL-SCNC: 102 MMOL/L (ref 98–107)
CREAT SERPL-MCNC: 0.69 MG/DL (ref 0.51–0.95)
EGFRCR SERPLBLD CKD-EPI 2021: >90 ML/MIN/1.73M2
EST. AVERAGE GLUCOSE BLD GHB EST-MCNC: 128 MG/DL
GLUCOSE SERPL-MCNC: 117 MG/DL (ref 70–99)
HBA1C MFR BLD: 6.1 %
HCO3 SERPL-SCNC: 29 MMOL/L (ref 22–29)
POTASSIUM SERPL-SCNC: 3.5 MMOL/L (ref 3.4–5.3)
PROT SERPL-MCNC: 7.2 G/DL (ref 6.4–8.3)
SODIUM SERPL-SCNC: 139 MMOL/L (ref 135–145)
T4 FREE SERPL-MCNC: 1.07 NG/DL (ref 0.9–1.7)
TSH SERPL DL<=0.005 MIU/L-ACNC: 8.2 UIU/ML (ref 0.3–4.2)

## 2024-12-23 PROCEDURE — 84439 ASSAY OF FREE THYROXINE: CPT | Mod: ZL

## 2024-12-23 PROCEDURE — 36415 COLL VENOUS BLD VENIPUNCTURE: CPT | Mod: ZL

## 2024-12-23 PROCEDURE — 80053 COMPREHEN METABOLIC PANEL: CPT | Mod: ZL

## 2024-12-23 PROCEDURE — 84443 ASSAY THYROID STIM HORMONE: CPT | Mod: ZL

## 2024-12-23 PROCEDURE — 83036 HEMOGLOBIN GLYCOSYLATED A1C: CPT | Mod: ZL

## 2024-12-28 SDOH — HEALTH STABILITY: PHYSICAL HEALTH: ON AVERAGE, HOW MANY MINUTES DO YOU ENGAGE IN EXERCISE AT THIS LEVEL?: 30 MIN

## 2024-12-28 SDOH — HEALTH STABILITY: PHYSICAL HEALTH: ON AVERAGE, HOW MANY DAYS PER WEEK DO YOU ENGAGE IN MODERATE TO STRENUOUS EXERCISE (LIKE A BRISK WALK)?: 2 DAYS

## 2024-12-28 ASSESSMENT — SOCIAL DETERMINANTS OF HEALTH (SDOH): HOW OFTEN DO YOU GET TOGETHER WITH FRIENDS OR RELATIVES?: ONCE A WEEK

## 2024-12-31 ENCOUNTER — OFFICE VISIT (OUTPATIENT)
Dept: FAMILY MEDICINE | Facility: OTHER | Age: 49
End: 2024-12-31
Attending: NURSE PRACTITIONER
Payer: COMMERCIAL

## 2024-12-31 VITALS
BODY MASS INDEX: 26.86 KG/M2 | DIASTOLIC BLOOD PRESSURE: 88 MMHG | OXYGEN SATURATION: 99 % | TEMPERATURE: 97.3 F | SYSTOLIC BLOOD PRESSURE: 122 MMHG | WEIGHT: 151.6 LBS | HEART RATE: 62 BPM | RESPIRATION RATE: 16 BRPM | HEIGHT: 63 IN

## 2024-12-31 DIAGNOSIS — Z00.00 ROUTINE GENERAL MEDICAL EXAMINATION AT A HEALTH CARE FACILITY: Primary | ICD-10-CM

## 2024-12-31 DIAGNOSIS — Z23 NEED FOR HEPATITIS B BOOSTER VACCINATION: ICD-10-CM

## 2024-12-31 DIAGNOSIS — Z12.31 ENCOUNTER FOR SCREENING MAMMOGRAM FOR BREAST CANCER: ICD-10-CM

## 2024-12-31 DIAGNOSIS — E05.00 GRAVES DISEASE: ICD-10-CM

## 2024-12-31 DIAGNOSIS — N95.1 PERIMENOPAUSAL SYMPTOMS: ICD-10-CM

## 2024-12-31 RX ORDER — LEVOTHYROXINE SODIUM 75 UG/1
75 TABLET ORAL DAILY
Qty: 90 TABLET | Refills: 4 | Status: SHIPPED | OUTPATIENT
Start: 2024-12-31

## 2024-12-31 ASSESSMENT — PAIN SCALES - GENERAL: PAINLEVEL_OUTOF10: NO PAIN (0)

## 2024-12-31 NOTE — PROGRESS NOTES
"Preventive Care Visit  [facility] AND [facility]  Tameka King, MARCUS CNP, Nurse Practitioner - Family  Dec 31, 2024      Assessment & Plan   Problem List Items Addressed This Visit          Endocrine    Graves disease    Relevant Medications    levothyroxine (SYNTHROID/LEVOTHROID) 75 MCG tablet    Other Relevant Orders    TSH Reflex GH     Other Visit Diagnoses       Routine general medical examination at a health care facility    -  Primary    Encounter for screening mammogram for breast cancer        Relevant Orders    MA Screen Bilateral w/Yoav    Need for hepatitis B booster vaccination        Relevant Orders    [facility] IMM-  HEPATITIS B VACCINE, ADULT, IM (Completed)    Perimenopausal symptoms               Reviewed labs that were completed, levothyroxine adjusted to 75 mcg 6 times a week, plan to recheck TSH in 6 to 8 weeks.  Mammogram ordered  Second hepatitis B vaccine updated, she is aware she needs to come in for her third  Discussed perimenopausal symptoms, symptomatic management including over-the-counter, prescription medications and lifestyle modification.  Follow-up as needed.    Patient has been advised of split billing requirements and indicates understanding: Yes       BMI  Estimated body mass index is 26.85 kg/m  as calculated from the following:    Height as of this encounter: 1.6 m (5' 3\").    Weight as of this encounter: 68.8 kg (151 lb 9.6 oz).   Weight management plan: Discussed healthy diet and exercise guidelines    Counseling  Appropriate preventive services were addressed with this patient via screening, questionnaire, or discussion as appropriate for fall prevention, nutrition, physical activity, Tobacco-use cessation, social engagement, weight loss and cognition.  Checklist reviewing preventive services available has been given to the patient.  Reviewed patient's diet, addressing concerns and/or questions.   She is at risk for lack of exercise and has been provided with information " to increase physical activity for the benefit of her well-being.   She is at risk for psychosocial distress and has been provided with information to reduce risk.         Return in about 53 weeks (around 1/6/2026) for Annual Wellness Visit.      Kayden Bailey is a 49 year old, presenting for the following:  Physical        12/31/2024    11:16 AM   Additional Questions   Roomed by Laurie LR LPN          HPI    She presents to clinic today for wellness exam,  accompanies her to her visit today.  She did have lab work done last week.  Levothyroxine continues 5 times a week, current TSH is elevated.  She is no longer following with endocrinology.   she does report having some perimenopausal symptoms, trouble sleeping at night, irregular menstruation, hot flashes.  She started an over-the-counter supplement to help with menopause symptoms.    Health Care Directive  Patient does not have a Health Care Directive:       12/28/2024   General Health   How would you rate your overall physical health? Excellent   Feel stress (tense, anxious, or unable to sleep) To some extent   (!) STRESS CONCERN      12/28/2024   Nutrition   Three or more servings of calcium each day? (!) I DON'T KNOW   Diet: Regular (no restrictions)   How many servings of fruit and vegetables per day? (!) 0-1   How many sweetened beverages each day? (!) I DON'T KNOW         12/28/2024   Exercise   Days per week of moderate/strenous exercise 2 days   Average minutes spent exercising at this level 30 min   (!) EXERCISE CONCERN      12/28/2024   Social Factors   Frequency of gathering with friends or relatives Once a week   Worry food won't last until get money to buy more No   Food not last or not have enough money for food? No   Do you have housing? (Housing is defined as stable permanent housing and does not include staying ouside in a car, in a tent, in an abandoned building, in an overnight shelter, or couch-surfing.) No   Are you worried about  losing your housing? No   Lack of transportation? No   Unable to get utilities (heat,electricity)? No   Want help with housing or utility concern? No   (!) HOUSING CONCERN PRESENT      12/28/2024   Dental   Dentist two times every year? Yes              Today's PHQ-2 Score:       9/25/2024     7:38 AM   PHQ-2 ( 1999 [employer])   Q1: Little interest or pleasure in doing things 0   Q2: Feeling down, depressed or hopeless 0   PHQ-2 Score 0   Q1: Little interest or pleasure in doing things Not at all   Q2: Feeling down, depressed or hopeless Not at all   PHQ-2 Score 0         12/28/2024   Substance Use   Alcohol more than 3/day or more than 7/wk No   Do you use any other substances recreationally? No     Social History     Tobacco Use    Smoking status: Never    Smokeless tobacco: Never   Vaping Use    Vaping status: Never Used   Substance Use Topics    Alcohol use: Yes     Comment: Occ    Drug use: Never           12/26/2022   LAST FHS-7 RESULTS   1st degree relative breast or ovarian cancer No   Any relative bilateral breast cancer No   Any male have breast cancer No   Any ONE woman have BOTH breast AND ovarian cancer No   Any woman with breast cancer before 50yrs No   2 or more relatives with breast AND/OR ovarian cancer No   2 or more relatives with breast AND/OR bowel cancer No        Mammogram Screening - Mammogram every 1-2 years updated in Health Maintenance based on mutual decision making        12/28/2024   STI Screening   New sexual partner(s) since last STI/HIV test? No     History of abnormal Pap smear: No - age 30- 64 PAP with HPV every 5 years recommended        Latest Ref Rng & Units 12/13/2022     2:47 PM 12/16/2019     8:52 AM   PAP / HPV   PAP  Negative for Intraepithelial Lesion or Malignancy (NILM)     PAP (Historical)   NIL    HPV 16 DNA Negative Negative  Negative    HPV 18 DNA Negative Negative  Negative    Other HR HPV Negative Negative  Positive      ASCVD Risk   The 10-year ASCVD risk  score (Ken ANDERSON, et al., 2019) is: 1.4%    Values used to calculate the score:      Age: 49 years      Sex: Female      Is Non- : No      Diabetic: No      Tobacco smoker: No      Systolic Blood Pressure: 122 mmHg      Is BP treated: No      HDL Cholesterol: 52 mg/dL      Total Cholesterol: 242 mg/dL        12/28/2024   Contraception/Family Planning   Questions about contraception or family planning No        Reviewed and updated as needed this visit by Provider                    History reviewed. No pertinent past medical history.  Past Surgical History:   Procedure Laterality Date    COLONOSCOPY N/A 01/25/2024    normal 10 yr follow up    DILATION AND CURETTAGE      2014 in 2016 for abortions.    Partial amputation      First digit right hand, posttraumatic at age 7 years old     BP Readings from Last 3 Encounters:   12/31/24 122/88   09/26/24 118/84   07/20/24 (!) 144/88    Wt Readings from Last 3 Encounters:   12/31/24 68.8 kg (151 lb 9.6 oz)   09/26/24 68.4 kg (150 lb 12.8 oz)   07/20/24 65.8 kg (145 lb)                  Patient Active Problem List   Diagnosis    Infection due to 2019 novel coronavirus    Graves disease    Acquired hypothyroidism     Past Surgical History:   Procedure Laterality Date    COLONOSCOPY N/A 01/25/2024    normal 10 yr follow up    DILATION AND CURETTAGE      2014 in 2016 for abortions.    Partial amputation      First digit right hand, posttraumatic at age 7 years old       Social History     Tobacco Use    Smoking status: Never    Smokeless tobacco: Never   Substance Use Topics    Alcohol use: Yes     Comment: Occ     Family History   Family history unknown: Yes         Current Outpatient Medications   Medication Sig Dispense Refill    levothyroxine (SYNTHROID/LEVOTHROID) 75 MCG tablet Take 1 tablet (75 mcg) by mouth daily. Patient takes medication 6 days a week 90 tablet 4    meclizine (ANTIVERT) 25 MG tablet Take 1 tablet (25 mg) by mouth 3 times daily as  "needed for dizziness. 30 tablet 0    ondansetron (ZOFRAN ODT) 4 MG ODT tab Take 1 tablet (4 mg) by mouth every 6 hours as needed for nausea. 15 tablet 0     No Known Allergies         Objective    Exam  /88   Pulse 62   Temp 97.3  F (36.3  C) (Tympanic)   Resp 16   Ht 1.6 m (5' 3\")   Wt 68.8 kg (151 lb 9.6 oz)   SpO2 99%   BMI 26.85 kg/m     Estimated body mass index is 26.85 kg/m  as calculated from the following:    Height as of this encounter: 1.6 m (5' 3\").    Weight as of this encounter: 68.8 kg (151 lb 9.6 oz).    Physical Exam  GENERAL: alert and no distress  EYES: Eyes grossly normal to inspection, PERRL and conjunctivae and sclerae normal  HENT: ear canals and TM's normal, nose and mouth without ulcers or lesions  NECK: no adenopathy, no asymmetry, masses, or scars  RESP: lungs clear to auscultation - no rales, rhonchi or wheezes  CV: regular rate and rhythm, normal S1 S2, no S3 or S4, no murmur, click or rub, no peripheral edema  ABDOMEN: soft, nontender, no hepatosplenomegaly, no masses and bowel sounds normal  MS: no gross musculoskeletal defects noted, no edema  SKIN: no suspicious lesions or rashes  NEURO: Normal strength and tone, mentation intact and speech normal  PSYCH: mentation appears normal, affect normal/bright        Signed Electronically by: MARCUS Bryan CNP    "

## 2024-12-31 NOTE — NURSING NOTE
"Chief Complaint   Patient presents with    Physical       Initial /88   Pulse 62   Temp 97.3  F (36.3  C) (Tympanic)   Resp 16   Ht 1.6 m (5' 3\")   Wt 68.8 kg (151 lb 9.6 oz)   SpO2 99%   BMI 26.85 kg/m   Estimated body mass index is 26.85 kg/m  as calculated from the following:    Height as of this encounter: 1.6 m (5' 3\").    Weight as of this encounter: 68.8 kg (151 lb 9.6 oz).  Medication Review: complete    The next two questions are to help us understand your food security.  If you are feeling you need any assistance in this area, we have resources available to support you today.          12/28/2024   SDOH- Food Insecurity   Within the past 12 months, did you worry that your food would run out before you got money to buy more? N   Within the past 12 months, did the food you bought just not last and you didn t have money to get more? N         Health Care Directive:  Patient does not have a Health Care Directive: Discussed advance care planning with patient; however, patient declined at this time.    Laurie Barnett, PRIMO      "

## 2024-12-31 NOTE — PATIENT INSTRUCTIONS
Try a magnesium supplement-recommendation would be Calm Magnesium-can order on Amazon      Patient Education   Preventive Care Advice   This is general advice given by our system to help you stay healthy. However, your care team may have specific advice just for you. Please talk to your care team about your preventive care needs.  Nutrition  Eat 5 or more servings of fruits and vegetables each day.  Try wheat bread, brown rice and whole grain pasta (instead of white bread, rice, and pasta).  Get enough calcium and vitamin D. Check the label on foods and aim for 100% of the RDA (recommended daily allowance).  Lifestyle  Exercise at least 150 minutes each week  (30 minutes a day, 5 days a week).  Do muscle strengthening activities 2 days a week. These help control your weight and prevent disease.  No smoking.  Wear sunscreen to prevent skin cancer.  Have a dental exam and cleaning every 6 months.  Yearly exams  See your health care team every year to talk about:  Any changes in your health.  Any medicines your care team has prescribed.  Preventive care, family planning, and ways to prevent chronic diseases.  Shots (vaccines)   HPV shots (up to age 26), if you've never had them before.  Hepatitis B shots (up to age 59), if you've never had them before.  COVID-19 shot: Get this shot when it's due.  Flu shot: Get a flu shot every year.  Tetanus shot: Get a tetanus shot every 10 years.  Pneumococcal, hepatitis A, and RSV shots: Ask your care team if you need these based on your risk.  Shingles shot (for age 50 and up)  General health tests  Diabetes screening:  Starting at age 35, Get screened for diabetes at least every 3 years.  If you are younger than age 35, ask your care team if you should be screened for diabetes.  Cholesterol test: At age 39, start having a cholesterol test every 5 years, or more often if advised.  Bone density scan (DEXA): At age 50, ask your care team if you should have this scan for osteoporosis  (brittle bones).  Hepatitis C: Get tested at least once in your life.  STIs (sexually transmitted infections)  Before age 24: Ask your care team if you should be screened for STIs.  After age 24: Get screened for STIs if you're at risk. You are at risk for STIs (including HIV) if:  You are sexually active with more than one person.  You don't use condoms every time.  You or a partner was diagnosed with a sexually transmitted infection.  If you are at risk for HIV, ask about PrEP medicine to prevent HIV.  Get tested for HIV at least once in your life, whether you are at risk for HIV or not.  Cancer screening tests  Cervical cancer screening: If you have a cervix, begin getting regular cervical cancer screening tests starting at age 21.  Breast cancer scan (mammogram): If you've ever had breasts, begin having regular mammograms starting at age 40. This is a scan to check for breast cancer.  Colon cancer screening: It is important to start screening for colon cancer at age 45.  Have a colonoscopy test every 10 years (or more often if you're at risk) Or, ask your provider about stool tests like a FIT test every year or Cologuard test every 3 years.  To learn more about your testing options, visit:   .  For help making a decision, visit:   https://bit.ly/pr07494.  Prostate cancer screening test: If you have a prostate, ask your care team if a prostate cancer screening test (PSA) at age 55 is right for you.  Lung cancer screening: If you are a current or former smoker ages 50 to 80, ask your care team if ongoing lung cancer screenings are right for you.  For informational purposes only. Not to replace the advice of your health care provider. Copyright   2023 [employer]. All rights reserved. Clinically reviewed by the [facility] Transitions Program. [employer] 719187 - REV 01/24.  Learning About Stress  What is stress?     Stress is your body's response to a hard situation. Your body can have a  physical, emotional, or mental response. Stress is a fact of life for most people, and it affects everyone differently. What causes stress for you may not be stressful for someone else.  A lot of things can cause stress. You may feel stress when you go on a job interview, take a test, or run a race. This kind of short-term stress is normal and even useful. It can help you if you need to work hard or react quickly. For example, stress can help you finish an important job on time.  Long-term stress is caused by ongoing stressful situations or events. Examples of long-term stress include long-term health problems, ongoing problems at work, or conflicts in your family. Long-term stress can harm your health.  How does stress affect your health?  When you are stressed, your body responds as though you are in danger. It makes hormones that speed up your heart, make you breathe faster, and give you a burst of energy. This is called the fight-or-flight stress response. If the stress is over quickly, your body goes back to normal and no harm is done.  But if stress happens too often or lasts too long, it can have bad effects. Long-term stress can make you more likely to get sick, and it can make symptoms of some diseases worse. If you tense up when you are stressed, you may develop neck, shoulder, or low back pain. Stress is linked to high blood pressure and heart disease.  Stress also harms your emotional health. It can make you wills, tense, or depressed. Your relationships may suffer, and you may not do well at work or school.  What can you do to manage stress?  You can try these things to help manage stress:   Do something active. Exercise or activity can help reduce stress. Walking is a great way to get started. Even everyday activities such as housecleaning or yard work can help.  Try yoga or amrita chi. These techniques combine exercise and meditation. You may need some training at first to learn them.  Do something you  "enjoy. For example, listen to music or go to a movie. Practice your hobby or do volunteer work.  Meditate. This can help you relax, because you are not worrying about what happened before or what may happen in the future.  Do guided imagery. Imagine yourself in any setting that helps you feel calm. You can use online videos, books, or a teacher to guide you.  Do breathing exercises. For example:  From a standing position, bend forward from the waist with your knees slightly bent. Let your arms dangle close to the floor.  Breathe in slowly and deeply as you return to a standing position. Roll up slowly and lift your head last.  Hold your breath for just a few seconds in the standing position.  Breathe out slowly and bend forward from the waist.  Let your feelings out. Talk, laugh, cry, and express anger when you need to. Talking with supportive friends or family, a counselor, or a foreign leader about your feelings is a healthy way to relieve stress. Avoid discussing your feelings with people who make you feel worse.  Write. It may help to write about things that are bothering you. This helps you find out how much stress you feel and what is causing it. When you know this, you can find better ways to cope.  What can you do to prevent stress?  You might try some of these things to help prevent stress:  Manage your time. This helps you find time to do the things you want and need to do.  Get enough sleep. Your body recovers from the stresses of the day while you are sleeping.  Get support. Your family, friends, and community can make a difference in how you experience stress.  Limit your news feed. Avoid or limit time on social media or news that may make you feel stressed.  Do something active. Exercise or activity can help reduce stress. Walking is a great way to get started.  Where can you learn more?  Go to https://www.healthwise.net/patiented  Enter N032 in the search box to learn more about \"Learning About " "Stress.\"  Current as of: October 24, 2023  Content Version: 14.3    2024 [employer].   Care instructions adapted under license by your healthcare professional. If you have questions about a medical condition or this instruction, always ask your healthcare professional. [employer] disclaims any warranty or liability for your use of this information.       "

## 2025-02-05 ENCOUNTER — HOSPITAL ENCOUNTER (OUTPATIENT)
Dept: MAMMOGRAPHY | Facility: OTHER | Age: 50
Discharge: HOME | End: 2025-02-05
Attending: NURSE PRACTITIONER
Payer: COMMERCIAL

## 2025-02-05 DIAGNOSIS — Z12.31 ENCOUNTER FOR SCREENING MAMMOGRAM FOR BREAST CANCER: ICD-10-CM

## 2025-02-05 PROCEDURE — 77063 BREAST TOMOSYNTHESIS BI: CPT

## 2025-02-12 ENCOUNTER — LAB (OUTPATIENT)
Dept: LAB | Facility: OTHER | Age: 50
End: 2025-02-12
Attending: NURSE PRACTITIONER
Payer: COMMERCIAL

## 2025-02-12 DIAGNOSIS — E05.00 GRAVES DISEASE: ICD-10-CM

## 2025-02-12 LAB — TSH SERPL DL<=0.005 MIU/L-ACNC: 2.38 UIU/ML (ref 0.3–4.2)

## 2025-02-12 PROCEDURE — 36415 COLL VENOUS BLD VENIPUNCTURE: CPT | Mod: ZL

## 2025-02-12 PROCEDURE — 84443 ASSAY THYROID STIM HORMONE: CPT | Mod: ZL

## 2025-02-18 ENCOUNTER — MYC MEDICAL ADVICE (OUTPATIENT)
Dept: FAMILY MEDICINE | Facility: OTHER | Age: 50
End: 2025-02-18
Payer: COMMERCIAL

## 2025-02-19 NOTE — TELEPHONE ENCOUNTER
Recommend testing again in 6 months to one year, sooner if concerns. She should call to schedule an appointment.

## 2025-02-19 NOTE — TELEPHONE ENCOUNTER
Wondering how frequent she needs to have labs done?     Per 2/12 lab, TSH is back to normal.     Started on Levothyroxine on 12/31 due to elevated TSH.     Routing to provider to review and respond.  Lucia Rosenberg RN on 2/19/2025 at 9:18 AM

## (undated) DEVICE — TUBING SUCTION 10'X3/16" N510

## (undated) DEVICE — SUCTION MANIFOLD NEPTUNE 2 SYS 4 PORT 0702-020-000

## (undated) DEVICE — SOL WATER 1500ML

## (undated) DEVICE — ENDO BRUSH CHANNEL MASTER CLEANING 2-4.2MM BW-412T

## (undated) DEVICE — ENDO KIT COMPLIANCE DYKENDOCMPLY

## (undated) RX ORDER — CEFTRIAXONE SODIUM 1 G
VIAL (EA) INJECTION
Status: DISPENSED
Start: 2024-07-20

## (undated) RX ORDER — KETOROLAC TROMETHAMINE 15 MG/ML
INJECTION, SOLUTION INTRAMUSCULAR; INTRAVENOUS
Status: DISPENSED
Start: 2024-07-20

## (undated) RX ORDER — PROPOFOL 10 MG/ML
INJECTION, EMULSION INTRAVENOUS
Status: DISPENSED
Start: 2024-01-25

## (undated) RX ORDER — MECLIZINE HCL 12.5 MG 12.5 MG/1
TABLET ORAL
Status: DISPENSED
Start: 2024-07-20

## (undated) RX ORDER — ONDANSETRON 2 MG/ML
INJECTION INTRAMUSCULAR; INTRAVENOUS
Status: DISPENSED
Start: 2024-07-20